# Patient Record
Sex: FEMALE | Race: WHITE | NOT HISPANIC OR LATINO | Employment: FULL TIME | URBAN - METROPOLITAN AREA
[De-identification: names, ages, dates, MRNs, and addresses within clinical notes are randomized per-mention and may not be internally consistent; named-entity substitution may affect disease eponyms.]

---

## 2023-05-23 PROBLEM — U07.1 COVID-19 VIRUS INFECTION: Status: ACTIVE | Noted: 2023-05-23

## 2023-05-23 PROBLEM — F41.1 GENERALIZED ANXIETY DISORDER: Status: ACTIVE | Noted: 2023-05-23

## 2023-05-23 PROBLEM — F32.5 DEPRESSION, MAJOR, IN REMISSION (CMS-HCC): Status: ACTIVE | Noted: 2023-05-23

## 2023-05-23 RX ORDER — PAROXETINE HYDROCHLORIDE 40 MG/1
1 TABLET, FILM COATED ORAL DAILY
COMMUNITY
Start: 2019-12-30 | End: 2023-06-06 | Stop reason: SDUPTHER

## 2023-05-23 RX ORDER — ONDANSETRON 4 MG/1
4 TABLET, ORALLY DISINTEGRATING ORAL
COMMUNITY
Start: 2020-07-30 | End: 2023-06-06 | Stop reason: WASHOUT

## 2023-05-23 RX ORDER — CODEINE PHOSPHATE AND GUAIFENESIN 10; 100 MG/5ML; MG/5ML
10 SOLUTION ORAL EVERY 8 HOURS
COMMUNITY
Start: 2022-11-04 | End: 2023-06-06 | Stop reason: ALTCHOICE

## 2023-06-06 ENCOUNTER — OFFICE VISIT (OUTPATIENT)
Dept: PRIMARY CARE | Facility: CLINIC | Age: 43
End: 2023-06-06
Payer: COMMERCIAL

## 2023-06-06 VITALS
SYSTOLIC BLOOD PRESSURE: 109 MMHG | OXYGEN SATURATION: 99 % | WEIGHT: 140 LBS | RESPIRATION RATE: 16 BRPM | BODY MASS INDEX: 23.32 KG/M2 | TEMPERATURE: 97.3 F | HEART RATE: 86 BPM | DIASTOLIC BLOOD PRESSURE: 71 MMHG | HEIGHT: 65 IN

## 2023-06-06 DIAGNOSIS — Z12.83 SKIN CANCER SCREENING: ICD-10-CM

## 2023-06-06 DIAGNOSIS — Z00.00 ANNUAL PHYSICAL EXAM: Primary | ICD-10-CM

## 2023-06-06 DIAGNOSIS — F42.4 SKIN-PICKING DISORDER: ICD-10-CM

## 2023-06-06 DIAGNOSIS — F41.1 GENERALIZED ANXIETY DISORDER: ICD-10-CM

## 2023-06-06 DIAGNOSIS — F32.5 DEPRESSION, MAJOR, IN REMISSION (CMS-HCC): ICD-10-CM

## 2023-06-06 PROBLEM — U07.1 COVID-19 VIRUS INFECTION: Status: RESOLVED | Noted: 2023-05-23 | Resolved: 2023-06-06

## 2023-06-06 PROCEDURE — 99396 PREV VISIT EST AGE 40-64: CPT | Performed by: FAMILY MEDICINE

## 2023-06-06 PROCEDURE — 1036F TOBACCO NON-USER: CPT | Performed by: FAMILY MEDICINE

## 2023-06-06 RX ORDER — PAROXETINE HYDROCHLORIDE 40 MG/1
40 TABLET, FILM COATED ORAL DAILY
Qty: 90 TABLET | Refills: 3 | Status: SHIPPED | OUTPATIENT
Start: 2023-06-06 | End: 2024-06-05

## 2023-06-06 SDOH — ECONOMIC STABILITY: FOOD INSECURITY: WITHIN THE PAST 12 MONTHS, YOU WORRIED THAT YOUR FOOD WOULD RUN OUT BEFORE YOU GOT MONEY TO BUY MORE.: NEVER TRUE

## 2023-06-06 SDOH — ECONOMIC STABILITY: FOOD INSECURITY: WITHIN THE PAST 12 MONTHS, THE FOOD YOU BOUGHT JUST DIDN'T LAST AND YOU DIDN'T HAVE MONEY TO GET MORE.: NEVER TRUE

## 2023-06-06 ASSESSMENT — LIFESTYLE VARIABLES
AUDIT-C TOTAL SCORE: 2
SKIP TO QUESTIONS 9-10: 1
HOW MANY STANDARD DRINKS CONTAINING ALCOHOL DO YOU HAVE ON A TYPICAL DAY: 1 OR 2
HOW OFTEN DO YOU HAVE A DRINK CONTAINING ALCOHOL: 2-4 TIMES A MONTH
HOW OFTEN DO YOU HAVE SIX OR MORE DRINKS ON ONE OCCASION: NEVER

## 2023-06-06 ASSESSMENT — PATIENT HEALTH QUESTIONNAIRE - PHQ9
1. LITTLE INTEREST OR PLEASURE IN DOING THINGS: NOT AT ALL
2. FEELING DOWN, DEPRESSED OR HOPELESS: NOT AT ALL
SUM OF ALL RESPONSES TO PHQ9 QUESTIONS 1 & 2: 0

## 2023-06-06 NOTE — PROGRESS NOTES
"Subjective   Patient ID: Deanna Davis is a 43 y.o. female who presents for Annual Exam and skin issues.  She is continuing to see a therapist for anxiety and will actually be doing a group session with her  later this week.     She has an appointment at [facility] and will be having a mammogram every 6 months and breast MRI every 6 months.     She believes she may have a skin picking disorder.         In addition to that documented in the HPI above, the additional ROS was obtained:  Constitutional: Denies fevers or chills  Eyes: Denies vision changes  ENMT: Denies trouble swallowing  Cardiovascular: Denies chest pain or heart racing  Respiratory: Denies SOB or cough  Gastrointestinal: Denies nausea, vomiting, diarrhea or constipation. Rare heartburn  Musculoskeletal: Denies joint pain or joint swelling  Neuro: denies frequent headaches or dizziness  Psych: denies depression or anxiety      Current Outpatient Medications   Medication Sig Dispense Refill    PARoxetine (Paxil) 40 mg tablet Take 1 tablet (40 mg) by mouth once daily. 90 tablet 3     No current facility-administered medications for this visit.       Objective     Visit Vitals  /71 (BP Location: Left arm, Patient Position: Sitting, BP Cuff Size: Adult)   Pulse 86   Temp 36.3 °C (97.3 °F)   Resp 16   Ht 1.656 m (5' 5.2\")   Wt 63.5 kg (140 lb)   SpO2 99%   BMI 23.15 kg/m²   Smoking Status Never   BSA 1.71 m²        Constitutional: Well nourished, well developed, appears stated age  Eyes: no scleral icterus, no conjunctival injection  Ears: normal external auditory canal, no retraction or bulging of tympanic membranes  Neck: no thyromegaly  Cardiovascular: regular rate and rhythm, no leg edema  Respiratory: normal respiratory effort, clear to auscultation bilaterally  Musculoskeletal: No gross deformities appreciated  Skin: Warm, dry. No rashes  Neurologic: Alert, CNs II-XII grossly intact..  Psych: Appropriate mood and affect.  "       Assessment/Plan   Problem List Items Addressed This Visit          Other    Depression, major, in remission (CMS/HCC)    Relevant Medications    PARoxetine (Paxil) 40 mg tablet    Generalized anxiety disorder    Relevant Medications    PARoxetine (Paxil) 40 mg tablet    Annual physical exam - Primary     Has gyn  Mammogram done 6/17/2022         Skin-picking disorder     Briefly discussed topiramate as possible treatment, she will consider and let me know.          Other Visit Diagnoses       Skin cancer screening        Relevant Orders    Referral to Dermatology          Follow up yearly and prn.    Nohemy Rojas, DO

## 2023-06-06 NOTE — PATIENT INSTRUCTIONS
Thank you for choosing [facility] for your healthcare.   As always if you have any questions or concerns please do not hesitate to call our office at [phone] or through [employer].    Have a great day!  Nohemy Rojas, DO

## 2023-10-20 ENCOUNTER — TELEPHONE (OUTPATIENT)
Dept: PRIMARY CARE | Facility: CLINIC | Age: 43
End: 2023-10-20
Payer: COMMERCIAL

## 2023-10-20 DIAGNOSIS — U07.1 COVID-19: Primary | ICD-10-CM

## 2023-10-20 NOTE — TELEPHONE ENCOUNTER
Pt started having symptoms of COVID on Monday but just tested positive yesterday. Pt is requesting an rx to help her.     How long have you been sick? Monday  Cough (productive or nonproductive)? Yes, productive   Color of phlegm? Pt isn't sure   Sinus pain? Yes   Sinus drainage/color? Yes, yellow   Earache? No  Congestion? Yes  Headache? Yes   Fever (if yes, temp)?  Sore throat? Yes  Short of breath/wheezing? No  Pt also has nausea. [facility] [place].

## 2023-11-30 PROBLEM — D25.9 UTERINE LEIOMYOMA: Status: ACTIVE | Noted: 2023-09-13

## 2023-11-30 RX ORDER — SPIRONOLACTONE 100 MG/1
100 TABLET, FILM COATED ORAL DAILY
COMMUNITY
Start: 2023-08-11

## 2023-11-30 RX ORDER — TRETINOIN 0.25 MG/G
CREAM TOPICAL NIGHTLY
COMMUNITY
Start: 2023-08-11

## 2023-12-01 ENCOUNTER — OFFICE VISIT (OUTPATIENT)
Dept: PRIMARY CARE | Facility: CLINIC | Age: 43
End: 2023-12-01
Payer: COMMERCIAL

## 2023-12-01 VITALS
RESPIRATION RATE: 16 BRPM | DIASTOLIC BLOOD PRESSURE: 73 MMHG | HEIGHT: 63 IN | OXYGEN SATURATION: 99 % | WEIGHT: 144 LBS | TEMPERATURE: 97.6 F | SYSTOLIC BLOOD PRESSURE: 110 MMHG | HEART RATE: 74 BPM | BODY MASS INDEX: 25.52 KG/M2

## 2023-12-01 DIAGNOSIS — F41.1 GENERALIZED ANXIETY DISORDER: Primary | ICD-10-CM

## 2023-12-01 PROCEDURE — 1036F TOBACCO NON-USER: CPT | Performed by: FAMILY MEDICINE

## 2023-12-01 PROCEDURE — 99214 OFFICE O/P EST MOD 30 MIN: CPT | Performed by: FAMILY MEDICINE

## 2023-12-01 RX ORDER — BUSPIRONE HYDROCHLORIDE 10 MG/1
10 TABLET ORAL 2 TIMES DAILY
Qty: 60 TABLET | Refills: 5 | Status: SHIPPED | OUTPATIENT
Start: 2023-12-01 | End: 2024-05-29

## 2023-12-01 ASSESSMENT — ENCOUNTER SYMPTOMS
LOSS OF SENSATION IN FEET: 0
OCCASIONAL FEELINGS OF UNSTEADINESS: 0
DEPRESSION: 0

## 2023-12-01 ASSESSMENT — ANXIETY QUESTIONNAIRES
3. WORRYING TOO MUCH ABOUT DIFFERENT THINGS: NEARLY EVERY DAY
5. BEING SO RESTLESS THAT IT IS HARD TO SIT STILL: SEVERAL DAYS
GAD7 TOTAL SCORE: 18
7. FEELING AFRAID AS IF SOMETHING AWFUL MIGHT HAPPEN: MORE THAN HALF THE DAYS
6. BECOMING EASILY ANNOYED OR IRRITABLE: NEARLY EVERY DAY
2. NOT BEING ABLE TO STOP OR CONTROL WORRYING: NEARLY EVERY DAY
1. FEELING NERVOUS, ANXIOUS, OR ON EDGE: NEARLY EVERY DAY
4. TROUBLE RELAXING: NEARLY EVERY DAY
IF YOU CHECKED OFF ANY PROBLEMS ON THIS QUESTIONNAIRE, HOW DIFFICULT HAVE THESE PROBLEMS MADE IT FOR YOU TO DO YOUR WORK, TAKE CARE OF THINGS AT HOME, OR GET ALONG WITH OTHER PEOPLE: VERY DIFFICULT

## 2023-12-01 ASSESSMENT — PAIN SCALES - GENERAL: PAINLEVEL: 0-NO PAIN

## 2023-12-01 NOTE — PATIENT INSTRUCTIONS
Thank you for choosing [facility] for your healthcare.   As always if you have any questions or concerns please do not hesitate to call our office at [phone] or through [email or web address].    Have a great day!  Nohemy Rojas, DO

## 2023-12-01 NOTE — PROGRESS NOTES
"Subjective   Patient ID: Deanna Davis is a 43 y.o. female who presents for Anxiety.  She is currently seeing a therapist and her and her  recently started couples counseling.     She has been on Paxil for a few years but recently, in the past 3-6 months, her and her family have noticed that she is having a lot of anxiety despite the medication. She has had a few panic attacks as well.         Current Outpatient Medications   Medication Sig Dispense Refill    PARoxetine (Paxil) 40 mg tablet Take 1 tablet (40 mg) by mouth once daily. 90 tablet 3    spironolactone (Aldactone) 100 mg tablet Take 1 tablet (100 mg) by mouth once daily.      tretinoin (Retin-A) 0.025 % cream Apply topically once daily at bedtime.      busPIRone (Buspar) 10 mg tablet Take 1 tablet (10 mg) by mouth 2 times a day. 60 tablet 5     No current facility-administered medications for this visit.       Objective     Visit Vitals  /73 (BP Location: Left arm, Patient Position: Sitting, BP Cuff Size: Adult)   Pulse 74   Temp 36.4 °C (97.6 °F) (Temporal)   Resp 16   Ht 1.588 m (5' 2.5\")   Wt 65.3 kg (144 lb)   SpO2 99%   BMI 25.92 kg/m²   Smoking Status Never   BSA 1.7 m²        Constitutional: Well nourished, well developed, appears stated age  Eyes: no scleral icterus, no conjunctival injection  Ears: normal external auditory canal, no retraction or bulging of tympanic membranes  Neck: no thyromegaly  Cardiovascular: regular rate and rhythm, no leg edema  Respiratory: normal respiratory effort, clear to auscultation bilaterally  Musculoskeletal: No gross deformities appreciated  Skin: Warm, dry. No rashes  Neurologic: Alert, CNs II-XII grossly intact..  Psych: Appropriate mood and affect.        Assessment/Plan   Problem List Items Addressed This Visit       Generalized anxiety disorder - Primary (Chronic)     Continue Paxil  Add Buspar 10 mg qAM, may increase to BID if needed  She will reach out via [employer] in about 1 month with " update         Relevant Medications    busPIRone (Buspar) 10 mg tablet     Follow up as scheduled.    Nohemy Rojas, DO

## 2024-06-10 NOTE — PATIENT INSTRUCTIONS
Please fast for 8 hours for the blood work. Water and black coffee is fine. You do not need an appointment to have your labs done.     Thank you for choosing [facility] for your healthcare.   As always if you have any questions or concerns please do not hesitate to call our office at [phone] or through [employer].    Have a great day!  Nohemy Rojas, DO

## 2024-06-10 NOTE — PROGRESS NOTES
"Subjective   Patient ID: Deanna Davis is a 44 y.o. female who presents for Annual Exam.  Her anxiety is currently controlled with Buspar but her depression waxes and wanes. She has only ever been on Paxil for depression.     She reports fatigue despite getting 8 hours of sleep per night.         Patient Care Team:  Nohemy Rojas DO as PCP - General  Farzana Richmond MD as Consulting Physician (Obstetrics and Gynecology)    Current Outpatient Medications   Medication Sig Dispense Refill    doxycycline (Vibramycin) 100 mg capsule Take 1 capsule (100 mg) by mouth every 12 hours.      spironolactone (Aldactone) 100 mg tablet Take 1 tablet (100 mg) by mouth once daily.      tretinoin (Retin-A) 0.025 % cream Apply topically once daily at bedtime.      busPIRone (Buspar) 10 mg tablet Take 1 tablet (10 mg) by mouth once daily. 90 tablet 3    PARoxetine (Paxil) 40 mg tablet Take 1 tablet (40 mg) by mouth once daily. 90 tablet 3     No current facility-administered medications for this visit.       Objective     Visit Vitals  /69 (BP Location: Right arm, Patient Position: Sitting)   Pulse 72   Temp 36.2 °C (97.1 °F) (Skin)   Resp 13   Ht 1.588 m (5' 2.5\")   Wt 65.8 kg (145 lb)   SpO2 99%   BMI 26.10 kg/m²   Smoking Status Never   BSA 1.7 m²        Constitutional: Well nourished, well developed, appears stated age  Eyes: no scleral icterus, no conjunctival injection  Ears: normal external auditory canal, no retraction or bulging of tympanic membranes  Neck: no thyromegaly  Cardiovascular: regular rate and rhythm, no leg edema  Respiratory: normal respiratory effort, clear to auscultation bilaterally  Musculoskeletal: No gross deformities appreciated  Skin: Warm, dry. No rashes  Neurologic: Alert, CNs II-XII grossly intact..  Psych: Appropriate mood and affect.        Assessment/Plan   Problem List Items Addressed This Visit       Moderate recurrent major depression (Multi)     Uncontrolled  Continue Paxil  Labs " ordered, if results unrevealing would consider switching from Paxil to Wellbutrin or Prozac         Relevant Medications    PARoxetine (Paxil) 40 mg tablet    Generalized anxiety disorder (Chronic)     Better with addition of Buspar, continue         Relevant Medications    busPIRone (Buspar) 10 mg tablet    PARoxetine (Paxil) 40 mg tablet    Annual physical exam - Primary     Has gyn  Mammogram done 6/17/2022          Other Visit Diagnoses       Screening for diabetes mellitus        Relevant Orders    Comprehensive Metabolic Panel    Screening for cholesterol level        Relevant Orders    Lipid Panel    Screening for thyroid disorder        Relevant Orders    TSH with reflex to Free T4 if abnormal    Fatigue, unspecified type        Relevant Orders    Vitamin B12    Screening for deficiency anemia        Relevant Orders    CBC            Follow up 6 months.    Nohemy Rojas, DO

## 2024-06-11 ENCOUNTER — APPOINTMENT (OUTPATIENT)
Dept: PRIMARY CARE | Facility: CLINIC | Age: 44
End: 2024-06-11
Payer: COMMERCIAL

## 2024-06-11 VITALS
BODY MASS INDEX: 25.69 KG/M2 | OXYGEN SATURATION: 99 % | SYSTOLIC BLOOD PRESSURE: 103 MMHG | HEART RATE: 72 BPM | WEIGHT: 145 LBS | DIASTOLIC BLOOD PRESSURE: 69 MMHG | RESPIRATION RATE: 13 BRPM | TEMPERATURE: 97.1 F | HEIGHT: 63 IN

## 2024-06-11 DIAGNOSIS — Z13.1 SCREENING FOR DIABETES MELLITUS: ICD-10-CM

## 2024-06-11 DIAGNOSIS — F41.1 GENERALIZED ANXIETY DISORDER: ICD-10-CM

## 2024-06-11 DIAGNOSIS — Z00.00 ANNUAL PHYSICAL EXAM: Primary | ICD-10-CM

## 2024-06-11 DIAGNOSIS — Z13.0 SCREENING FOR DEFICIENCY ANEMIA: ICD-10-CM

## 2024-06-11 DIAGNOSIS — Z13.29 SCREENING FOR THYROID DISORDER: ICD-10-CM

## 2024-06-11 DIAGNOSIS — R53.83 FATIGUE, UNSPECIFIED TYPE: ICD-10-CM

## 2024-06-11 DIAGNOSIS — Z13.220 SCREENING FOR CHOLESTEROL LEVEL: ICD-10-CM

## 2024-06-11 DIAGNOSIS — F32.5 DEPRESSION, MAJOR, IN REMISSION (CMS-HCC): ICD-10-CM

## 2024-06-11 DIAGNOSIS — F33.1 MODERATE RECURRENT MAJOR DEPRESSION (MULTI): ICD-10-CM

## 2024-06-11 PROCEDURE — 1036F TOBACCO NON-USER: CPT | Performed by: FAMILY MEDICINE

## 2024-06-11 PROCEDURE — 99396 PREV VISIT EST AGE 40-64: CPT | Performed by: FAMILY MEDICINE

## 2024-06-11 RX ORDER — DOXYCYCLINE 100 MG/1
1 CAPSULE ORAL
COMMUNITY
Start: 2024-05-06

## 2024-06-11 RX ORDER — PAROXETINE HYDROCHLORIDE 40 MG/1
40 TABLET, FILM COATED ORAL DAILY
Qty: 90 TABLET | Refills: 3 | Status: SHIPPED | OUTPATIENT
Start: 2024-06-11 | End: 2025-06-11

## 2024-06-11 RX ORDER — BUSPIRONE HYDROCHLORIDE 10 MG/1
10 TABLET ORAL DAILY
Qty: 90 TABLET | Refills: 3 | Status: SHIPPED | OUTPATIENT
Start: 2024-06-11 | End: 2025-06-11

## 2024-06-11 SDOH — ECONOMIC STABILITY: FOOD INSECURITY: WITHIN THE PAST 12 MONTHS, YOU WORRIED THAT YOUR FOOD WOULD RUN OUT BEFORE YOU GOT MONEY TO BUY MORE.: NEVER TRUE

## 2024-06-11 SDOH — ECONOMIC STABILITY: FOOD INSECURITY: WITHIN THE PAST 12 MONTHS, THE FOOD YOU BOUGHT JUST DIDN'T LAST AND YOU DIDN'T HAVE MONEY TO GET MORE.: NEVER TRUE

## 2024-06-11 ASSESSMENT — LIFESTYLE VARIABLES
SKIP TO QUESTIONS 9-10: 1
AUDIT-C TOTAL SCORE: 0
HOW MANY STANDARD DRINKS CONTAINING ALCOHOL DO YOU HAVE ON A TYPICAL DAY: PATIENT DOES NOT DRINK
HOW OFTEN DO YOU HAVE A DRINK CONTAINING ALCOHOL: NEVER
HOW OFTEN DO YOU HAVE SIX OR MORE DRINKS ON ONE OCCASION: NEVER

## 2024-06-11 ASSESSMENT — PATIENT HEALTH QUESTIONNAIRE - PHQ9
6. FEELING BAD ABOUT YOURSELF - OR THAT YOU ARE A FAILURE OR HAVE LET YOURSELF OR YOUR FAMILY DOWN: SEVERAL DAYS
10. IF YOU CHECKED OFF ANY PROBLEMS, HOW DIFFICULT HAVE THESE PROBLEMS MADE IT FOR YOU TO DO YOUR WORK, TAKE CARE OF THINGS AT HOME, OR GET ALONG WITH OTHER PEOPLE: SOMEWHAT DIFFICULT
3. TROUBLE FALLING OR STAYING ASLEEP: NOT AT ALL
4. FEELING TIRED OR HAVING LITTLE ENERGY: NEARLY EVERY DAY
2. FEELING DOWN, DEPRESSED OR HOPELESS: MORE THAN HALF THE DAYS
SUM OF ALL RESPONSES TO PHQ9 QUESTIONS 1 & 2: 3
8. MOVING OR SPEAKING SO SLOWLY THAT OTHER PEOPLE COULD HAVE NOTICED. OR THE OPPOSITE, BEING SO FIGETY OR RESTLESS THAT YOU HAVE BEEN MOVING AROUND A LOT MORE THAN USUAL: NOT AT ALL
1. LITTLE INTEREST OR PLEASURE IN DOING THINGS: SEVERAL DAYS
7. TROUBLE CONCENTRATING ON THINGS, SUCH AS READING THE NEWSPAPER OR WATCHING TELEVISION: NOT AT ALL
9. THOUGHTS THAT YOU WOULD BE BETTER OFF DEAD, OR OF HURTING YOURSELF: NOT AT ALL
SUM OF ALL RESPONSES TO PHQ QUESTIONS 1-9: 10
5. POOR APPETITE OR OVEREATING: NEARLY EVERY DAY

## 2024-06-11 ASSESSMENT — ANXIETY QUESTIONNAIRES
5. BEING SO RESTLESS THAT IT IS HARD TO SIT STILL: NOT AT ALL
IF YOU CHECKED OFF ANY PROBLEMS ON THIS QUESTIONNAIRE, HOW DIFFICULT HAVE THESE PROBLEMS MADE IT FOR YOU TO DO YOUR WORK, TAKE CARE OF THINGS AT HOME, OR GET ALONG WITH OTHER PEOPLE: SOMEWHAT DIFFICULT
4. TROUBLE RELAXING: NOT AT ALL
1. FEELING NERVOUS, ANXIOUS, OR ON EDGE: NEARLY EVERY DAY
2. NOT BEING ABLE TO STOP OR CONTROL WORRYING: NEARLY EVERY DAY
7. FEELING AFRAID AS IF SOMETHING AWFUL MIGHT HAPPEN: MORE THAN HALF THE DAYS
3. WORRYING TOO MUCH ABOUT DIFFERENT THINGS: NEARLY EVERY DAY
6. BECOMING EASILY ANNOYED OR IRRITABLE: SEVERAL DAYS
GAD7 TOTAL SCORE: 12

## 2024-06-11 ASSESSMENT — PAIN SCALES - GENERAL: PAINLEVEL: 0-NO PAIN

## 2024-06-11 NOTE — ASSESSMENT & PLAN NOTE
Uncontrolled  Continue Paxil  Labs ordered, if results unrevealing would consider switching from Paxil to Wellbutrin or Prozac

## 2024-06-22 ENCOUNTER — LAB (OUTPATIENT)
Dept: LAB | Facility: LAB | Age: 44
End: 2024-06-22
Payer: COMMERCIAL

## 2024-06-22 DIAGNOSIS — R53.83 FATIGUE, UNSPECIFIED TYPE: ICD-10-CM

## 2024-06-22 DIAGNOSIS — Z13.29 SCREENING FOR THYROID DISORDER: ICD-10-CM

## 2024-06-22 DIAGNOSIS — Z13.220 SCREENING FOR CHOLESTEROL LEVEL: ICD-10-CM

## 2024-06-22 DIAGNOSIS — Z13.1 SCREENING FOR DIABETES MELLITUS: ICD-10-CM

## 2024-06-22 DIAGNOSIS — Z13.0 SCREENING FOR DEFICIENCY ANEMIA: ICD-10-CM

## 2024-06-22 LAB
ALBUMIN SERPL BCP-MCNC: 4.1 G/DL (ref 3.4–5)
ALP SERPL-CCNC: 27 U/L (ref 33–110)
ALT SERPL W P-5'-P-CCNC: 8 U/L (ref 7–45)
ANION GAP SERPL CALC-SCNC: 9 MMOL/L (ref 10–20)
AST SERPL W P-5'-P-CCNC: 12 U/L (ref 9–39)
BILIRUB SERPL-MCNC: 0.5 MG/DL (ref 0–1.2)
BUN SERPL-MCNC: 29 MG/DL (ref 6–23)
CALCIUM SERPL-MCNC: 8.9 MG/DL (ref 8.6–10.3)
CHLORIDE SERPL-SCNC: 103 MMOL/L (ref 98–107)
CHOLEST SERPL-MCNC: 157 MG/DL (ref 0–199)
CHOLESTEROL/HDL RATIO: 3.1
CO2 SERPL-SCNC: 30 MMOL/L (ref 21–32)
CREAT SERPL-MCNC: 0.74 MG/DL (ref 0.5–1.05)
EGFRCR SERPLBLD CKD-EPI 2021: >90 ML/MIN/1.73M*2
ERYTHROCYTE [DISTWIDTH] IN BLOOD BY AUTOMATED COUNT: 13.1 % (ref 11.5–14.5)
GLUCOSE SERPL-MCNC: 76 MG/DL (ref 74–99)
HCT VFR BLD AUTO: 42.4 % (ref 36–46)
HDLC SERPL-MCNC: 51.3 MG/DL
HGB BLD-MCNC: 14.4 G/DL (ref 12–16)
LDLC SERPL CALC-MCNC: 93 MG/DL
MCH RBC QN AUTO: 31.4 PG (ref 26–34)
MCHC RBC AUTO-ENTMCNC: 34 G/DL (ref 32–36)
MCV RBC AUTO: 92 FL (ref 80–100)
NON HDL CHOLESTEROL: 106 MG/DL (ref 0–149)
NRBC BLD-RTO: 0 /100 WBCS (ref 0–0)
PLATELET # BLD AUTO: 277 X10*3/UL (ref 150–450)
POTASSIUM SERPL-SCNC: 4.6 MMOL/L (ref 3.5–5.3)
PROT SERPL-MCNC: 6.5 G/DL (ref 6.4–8.2)
RBC # BLD AUTO: 4.59 X10*6/UL (ref 4–5.2)
SODIUM SERPL-SCNC: 137 MMOL/L (ref 136–145)
TRIGL SERPL-MCNC: 64 MG/DL (ref 0–149)
TSH SERPL-ACNC: 0.85 MIU/L (ref 0.44–3.98)
VIT B12 SERPL-MCNC: 718 PG/ML (ref 211–911)
VLDL: 13 MG/DL (ref 0–40)
WBC # BLD AUTO: 3.9 X10*3/UL (ref 4.4–11.3)

## 2024-06-22 PROCEDURE — 80053 COMPREHEN METABOLIC PANEL: CPT

## 2024-06-22 PROCEDURE — 82607 VITAMIN B-12: CPT

## 2024-06-22 PROCEDURE — 36415 COLL VENOUS BLD VENIPUNCTURE: CPT

## 2024-06-22 PROCEDURE — 84443 ASSAY THYROID STIM HORMONE: CPT

## 2024-06-22 PROCEDURE — 80061 LIPID PANEL: CPT

## 2024-06-22 PROCEDURE — 85027 COMPLETE CBC AUTOMATED: CPT

## 2024-06-29 ENCOUNTER — PATIENT MESSAGE (OUTPATIENT)
Dept: PRIMARY CARE | Facility: CLINIC | Age: 44
End: 2024-06-29
Payer: COMMERCIAL

## 2024-06-29 DIAGNOSIS — F41.1 GENERALIZED ANXIETY DISORDER: ICD-10-CM

## 2024-06-29 DIAGNOSIS — F32.5 DEPRESSION, MAJOR, IN REMISSION (CMS-HCC): ICD-10-CM

## 2024-07-01 RX ORDER — PAROXETINE 30 MG/1
30 TABLET, FILM COATED ORAL DAILY
Qty: 30 TABLET | Refills: 0 | Status: SHIPPED | OUTPATIENT
Start: 2024-07-01 | End: 2024-07-31

## 2024-07-01 RX ORDER — BUPROPION HYDROCHLORIDE 150 MG/1
150 TABLET ORAL EVERY MORNING
Qty: 30 TABLET | Refills: 5 | Status: SHIPPED | OUTPATIENT
Start: 2024-07-01 | End: 2024-12-28

## 2024-07-26 ENCOUNTER — PATIENT MESSAGE (OUTPATIENT)
Dept: PRIMARY CARE | Facility: CLINIC | Age: 44
End: 2024-07-26
Payer: COMMERCIAL

## 2024-07-30 ENCOUNTER — TELEPHONE (OUTPATIENT)
Dept: PRIMARY CARE | Facility: CLINIC | Age: 44
End: 2024-07-30
Payer: COMMERCIAL

## 2024-07-30 DIAGNOSIS — F32.5 DEPRESSION, MAJOR, IN REMISSION (CMS-HCC): ICD-10-CM

## 2024-07-30 DIAGNOSIS — F41.1 GENERALIZED ANXIETY DISORDER: ICD-10-CM

## 2024-07-30 RX ORDER — PAROXETINE HYDROCHLORIDE 20 MG/1
TABLET, FILM COATED ORAL
Qty: 21 TABLET | Refills: 0 | Status: SHIPPED | OUTPATIENT
Start: 2024-07-30

## 2024-07-30 NOTE — TELEPHONE ENCOUNTER
[facility] pharmacy called [phone] to clarify the directions and quantity. They said there are also no directions. Would like another prescription sent in.

## 2024-09-03 ENCOUNTER — PATIENT MESSAGE (OUTPATIENT)
Dept: PRIMARY CARE | Facility: CLINIC | Age: 44
End: 2024-09-03
Payer: COMMERCIAL

## 2024-09-03 DIAGNOSIS — F33.1 MODERATE RECURRENT MAJOR DEPRESSION (MULTI): ICD-10-CM

## 2024-09-03 DIAGNOSIS — F41.1 GENERALIZED ANXIETY DISORDER: Primary | Chronic | ICD-10-CM

## 2024-09-03 RX ORDER — PAROXETINE 10 MG/1
TABLET, FILM COATED ORAL
Qty: 21 TABLET | Refills: 0 | Status: SHIPPED | OUTPATIENT
Start: 2024-09-03

## 2024-09-25 ENCOUNTER — PATIENT MESSAGE (OUTPATIENT)
Dept: PRIMARY CARE | Facility: CLINIC | Age: 44
End: 2024-09-25
Payer: COMMERCIAL

## 2024-09-25 DIAGNOSIS — F33.1 MODERATE RECURRENT MAJOR DEPRESSION: Primary | ICD-10-CM

## 2024-09-25 RX ORDER — PAROXETINE 10 MG/5ML
7.5 SUSPENSION ORAL EVERY MORNING
Qty: 250 ML | Refills: 0 | Status: SHIPPED | OUTPATIENT
Start: 2024-09-25 | End: 2024-11-30

## 2024-11-20 ENCOUNTER — TELEPHONE (OUTPATIENT)
Dept: PRIMARY CARE | Facility: CLINIC | Age: 44
End: 2024-11-20
Payer: COMMERCIAL

## 2024-11-20 DIAGNOSIS — F41.1 GENERALIZED ANXIETY DISORDER: ICD-10-CM

## 2024-11-20 DIAGNOSIS — F32.5 DEPRESSION, MAJOR, IN REMISSION (CMS-HCC): ICD-10-CM

## 2024-11-20 RX ORDER — BUSPIRONE HYDROCHLORIDE 10 MG/1
10 TABLET ORAL DAILY
Qty: 90 TABLET | Refills: 0 | Status: SHIPPED | OUTPATIENT
Start: 2024-11-20 | End: 2025-11-20

## 2024-11-20 RX ORDER — BUPROPION HYDROCHLORIDE 150 MG/1
150 TABLET ORAL EVERY MORNING
Qty: 90 TABLET | Refills: 0 | Status: SHIPPED | OUTPATIENT
Start: 2024-11-20 | End: 2025-02-18

## 2024-11-20 NOTE — TELEPHONE ENCOUNTER
Patient needs refill on:      buPROPion XL (Wellbutrin XL) 150 mg 24 hr tablet [[id number]     busPIRone (Buspar) 10 mg tablet [[id number]]     She wants to change her pharmacy to mail order:    [facility]

## 2024-11-25 DIAGNOSIS — F41.1 GENERALIZED ANXIETY DISORDER: ICD-10-CM

## 2024-11-25 DIAGNOSIS — F32.5 DEPRESSION, MAJOR, IN REMISSION (CMS-HCC): ICD-10-CM

## 2024-11-25 RX ORDER — BUPROPION HYDROCHLORIDE 150 MG/1
150 TABLET ORAL EVERY MORNING
Qty: 30 TABLET | Refills: 0 | Status: SHIPPED | OUTPATIENT
Start: 2024-11-25 | End: 2024-12-25

## 2024-11-25 RX ORDER — BUSPIRONE HYDROCHLORIDE 10 MG/1
10 TABLET ORAL DAILY
Qty: 30 TABLET | Refills: 0 | Status: SHIPPED | OUTPATIENT
Start: 2024-11-25 | End: 2025-11-25

## 2024-12-02 DIAGNOSIS — F33.1 MODERATE RECURRENT MAJOR DEPRESSION: ICD-10-CM

## 2024-12-02 RX ORDER — PAROXETINE 10 MG/5ML
7.5 SUSPENSION ORAL EVERY MORNING
Qty: 250 ML | Refills: 0 | Status: SHIPPED | OUTPATIENT
Start: 2024-12-02 | End: 2024-12-04 | Stop reason: SDUPTHER

## 2024-12-02 NOTE — TELEPHONE ENCOUNTER
Rx refill request [facility] [place], [place]   Paroxetine 10 mg     LOV 6/11/2024  NOV 12/17/2024  Cancellations None

## 2024-12-04 RX ORDER — PAROXETINE 10 MG/5ML
7.5 SUSPENSION ORAL EVERY MORNING
Qty: 250 ML | Refills: 0 | Status: SHIPPED | OUTPATIENT
Start: 2024-12-04 | End: 2025-02-08

## 2024-12-04 NOTE — TELEPHONE ENCOUNTER
Refill was sent to [place] pharmacy on Monday.  Please let patient know and recommend she reach out to pharmacy to check status of refill. Thanks,  Nohemy Rojas, DO

## 2024-12-17 ENCOUNTER — LAB (OUTPATIENT)
Dept: LAB | Facility: LAB | Age: 44
End: 2024-12-17
Payer: COMMERCIAL

## 2024-12-17 ENCOUNTER — APPOINTMENT (OUTPATIENT)
Dept: PRIMARY CARE | Facility: CLINIC | Age: 44
End: 2024-12-17
Payer: COMMERCIAL

## 2024-12-17 VITALS
HEART RATE: 84 BPM | BODY MASS INDEX: 25.34 KG/M2 | OXYGEN SATURATION: 99 % | SYSTOLIC BLOOD PRESSURE: 103 MMHG | HEIGHT: 63 IN | DIASTOLIC BLOOD PRESSURE: 72 MMHG | WEIGHT: 143 LBS | RESPIRATION RATE: 20 BRPM | TEMPERATURE: 96.8 F

## 2024-12-17 DIAGNOSIS — D72.819 LEUKOPENIA, UNSPECIFIED TYPE: ICD-10-CM

## 2024-12-17 DIAGNOSIS — F41.1 GENERALIZED ANXIETY DISORDER: ICD-10-CM

## 2024-12-17 DIAGNOSIS — F33.1 MODERATE RECURRENT MAJOR DEPRESSION: Chronic | ICD-10-CM

## 2024-12-17 DIAGNOSIS — Z80.3 FAMILY HISTORY OF BREAST CANCER: ICD-10-CM

## 2024-12-17 DIAGNOSIS — F33.1 MODERATE RECURRENT MAJOR DEPRESSION: Primary | Chronic | ICD-10-CM

## 2024-12-17 DIAGNOSIS — Z82.0 FAMILY HISTORY OF ALZHEIMER DISEASE: ICD-10-CM

## 2024-12-17 LAB
ALBUMIN SERPL BCP-MCNC: 4.4 G/DL (ref 3.4–5)
ALP SERPL-CCNC: 34 U/L (ref 33–110)
ALT SERPL W P-5'-P-CCNC: 7 U/L (ref 7–45)
ANION GAP SERPL CALC-SCNC: 10 MMOL/L (ref 10–20)
AST SERPL W P-5'-P-CCNC: 13 U/L (ref 9–39)
BASOPHILS # BLD AUTO: 0.04 X10*3/UL (ref 0–0.1)
BASOPHILS NFR BLD AUTO: 0.8 %
BILIRUB SERPL-MCNC: 0.3 MG/DL (ref 0–1.2)
BUN SERPL-MCNC: 28 MG/DL (ref 6–23)
CALCIUM SERPL-MCNC: 9.4 MG/DL (ref 8.6–10.3)
CHLORIDE SERPL-SCNC: 99 MMOL/L (ref 98–107)
CO2 SERPL-SCNC: 29 MMOL/L (ref 21–32)
CREAT SERPL-MCNC: 0.79 MG/DL (ref 0.5–1.05)
EGFRCR SERPLBLD CKD-EPI 2021: >90 ML/MIN/1.73M*2
EOSINOPHIL # BLD AUTO: 0.1 X10*3/UL (ref 0–0.7)
EOSINOPHIL NFR BLD AUTO: 1.9 %
ERYTHROCYTE [DISTWIDTH] IN BLOOD BY AUTOMATED COUNT: 12.6 % (ref 11.5–14.5)
GLUCOSE SERPL-MCNC: 83 MG/DL (ref 74–99)
HCT VFR BLD AUTO: 44.7 % (ref 36–46)
HGB BLD-MCNC: 15 G/DL (ref 12–16)
IMM GRANULOCYTES # BLD AUTO: 0.01 X10*3/UL (ref 0–0.7)
IMM GRANULOCYTES NFR BLD AUTO: 0.2 % (ref 0–0.9)
LYMPHOCYTES # BLD AUTO: 1.59 X10*3/UL (ref 1.2–4.8)
LYMPHOCYTES NFR BLD AUTO: 30.9 %
MCH RBC QN AUTO: 30.8 PG (ref 26–34)
MCHC RBC AUTO-ENTMCNC: 33.6 G/DL (ref 32–36)
MCV RBC AUTO: 92 FL (ref 80–100)
MONOCYTES # BLD AUTO: 0.3 X10*3/UL (ref 0.1–1)
MONOCYTES NFR BLD AUTO: 5.8 %
NEUTROPHILS # BLD AUTO: 3.11 X10*3/UL (ref 1.2–7.7)
NEUTROPHILS NFR BLD AUTO: 60.4 %
NRBC BLD-RTO: 0 /100 WBCS (ref 0–0)
PLATELET # BLD AUTO: 309 X10*3/UL (ref 150–450)
POTASSIUM SERPL-SCNC: 4.1 MMOL/L (ref 3.5–5.3)
PROT SERPL-MCNC: 7.5 G/DL (ref 6.4–8.2)
RBC # BLD AUTO: 4.87 X10*6/UL (ref 4–5.2)
SODIUM SERPL-SCNC: 134 MMOL/L (ref 136–145)
WBC # BLD AUTO: 5.2 X10*3/UL (ref 4.4–11.3)

## 2024-12-17 PROCEDURE — 85025 COMPLETE CBC W/AUTO DIFF WBC: CPT

## 2024-12-17 PROCEDURE — 3008F BODY MASS INDEX DOCD: CPT | Performed by: FAMILY MEDICINE

## 2024-12-17 PROCEDURE — 36415 COLL VENOUS BLD VENIPUNCTURE: CPT

## 2024-12-17 PROCEDURE — 99213 OFFICE O/P EST LOW 20 MIN: CPT | Performed by: FAMILY MEDICINE

## 2024-12-17 PROCEDURE — 80053 COMPREHEN METABOLIC PANEL: CPT

## 2024-12-17 RX ORDER — BUSPIRONE HYDROCHLORIDE 10 MG/1
10 TABLET ORAL 2 TIMES DAILY
Qty: 180 TABLET | Refills: 3 | Status: SHIPPED | OUTPATIENT
Start: 2024-12-17 | End: 2025-12-17

## 2024-12-17 RX ORDER — PAROXETINE 10 MG/1
5 TABLET, FILM COATED ORAL EVERY MORNING
COMMUNITY

## 2024-12-17 SDOH — ECONOMIC STABILITY: FOOD INSECURITY: WITHIN THE PAST 12 MONTHS, YOU WORRIED THAT YOUR FOOD WOULD RUN OUT BEFORE YOU GOT MONEY TO BUY MORE.: NEVER TRUE

## 2024-12-17 SDOH — ECONOMIC STABILITY: FOOD INSECURITY: WITHIN THE PAST 12 MONTHS, THE FOOD YOU BOUGHT JUST DIDN'T LAST AND YOU DIDN'T HAVE MONEY TO GET MORE.: NEVER TRUE

## 2024-12-17 ASSESSMENT — LIFESTYLE VARIABLES
HOW MANY STANDARD DRINKS CONTAINING ALCOHOL DO YOU HAVE ON A TYPICAL DAY: 1 OR 2
HOW OFTEN DO YOU HAVE A DRINK CONTAINING ALCOHOL: MONTHLY OR LESS
HOW OFTEN DO YOU HAVE SIX OR MORE DRINKS ON ONE OCCASION: NEVER
AUDIT-C TOTAL SCORE: 1
SKIP TO QUESTIONS 9-10: 1

## 2024-12-17 ASSESSMENT — ENCOUNTER SYMPTOMS
DEPRESSION: 1
LOSS OF SENSATION IN FEET: 0

## 2024-12-17 ASSESSMENT — ANXIETY QUESTIONNAIRES
5. BEING SO RESTLESS THAT IT IS HARD TO SIT STILL: NEARLY EVERY DAY
6. BECOMING EASILY ANNOYED OR IRRITABLE: NEARLY EVERY DAY
IF YOU CHECKED OFF ANY PROBLEMS ON THIS QUESTIONNAIRE, HOW DIFFICULT HAVE THESE PROBLEMS MADE IT FOR YOU TO DO YOUR WORK, TAKE CARE OF THINGS AT HOME, OR GET ALONG WITH OTHER PEOPLE: VERY DIFFICULT
4. TROUBLE RELAXING: NEARLY EVERY DAY
3. WORRYING TOO MUCH ABOUT DIFFERENT THINGS: NEARLY EVERY DAY
7. FEELING AFRAID AS IF SOMETHING AWFUL MIGHT HAPPEN: MORE THAN HALF THE DAYS
1. FEELING NERVOUS, ANXIOUS, OR ON EDGE: NEARLY EVERY DAY
GAD7 TOTAL SCORE: 20
2. NOT BEING ABLE TO STOP OR CONTROL WORRYING: NEARLY EVERY DAY

## 2024-12-17 ASSESSMENT — PATIENT HEALTH QUESTIONNAIRE - PHQ9
7. TROUBLE CONCENTRATING ON THINGS, SUCH AS READING THE NEWSPAPER OR WATCHING TELEVISION: NOT AT ALL
9. THOUGHTS THAT YOU WOULD BE BETTER OFF DEAD, OR OF HURTING YOURSELF: NOT AT ALL
6. FEELING BAD ABOUT YOURSELF - OR THAT YOU ARE A FAILURE OR HAVE LET YOURSELF OR YOUR FAMILY DOWN: MORE THAN HALF THE DAYS
SUM OF ALL RESPONSES TO PHQ QUESTIONS 1-9: 12
4. FEELING TIRED OR HAVING LITTLE ENERGY: MORE THAN HALF THE DAYS
1. LITTLE INTEREST OR PLEASURE IN DOING THINGS: NEARLY EVERY DAY
5. POOR APPETITE OR OVEREATING: MORE THAN HALF THE DAYS
10. IF YOU CHECKED OFF ANY PROBLEMS, HOW DIFFICULT HAVE THESE PROBLEMS MADE IT FOR YOU TO DO YOUR WORK, TAKE CARE OF THINGS AT HOME, OR GET ALONG WITH OTHER PEOPLE: SOMEWHAT DIFFICULT
8. MOVING OR SPEAKING SO SLOWLY THAT OTHER PEOPLE COULD HAVE NOTICED. OR THE OPPOSITE, BEING SO FIGETY OR RESTLESS THAT YOU HAVE BEEN MOVING AROUND A LOT MORE THAN USUAL: NOT AT ALL
2. FEELING DOWN, DEPRESSED OR HOPELESS: NEARLY EVERY DAY
SUM OF ALL RESPONSES TO PHQ9 QUESTIONS 1 & 2: 6
3. TROUBLE FALLING OR STAYING ASLEEP: NOT AT ALL

## 2024-12-17 ASSESSMENT — PAIN SCALES - GENERAL: PAINLEVEL_OUTOF10: 0-NO PAIN

## 2024-12-17 NOTE — PATIENT INSTRUCTIONS
Please call  central scheduling [phone] to schedule your referral with genetic counselor    Thank you for choosing [facility] for your healthcare.   As always if you have any questions or concerns please do not hesitate to call our office at [phone] or through [employer].    Have a great day!  Nohemy Rojas, DO

## 2024-12-17 NOTE — ASSESSMENT & PLAN NOTE
GAD7 score 20 suggestive of severe anxiety  Uncontrolled currently due to stressors discussed increasing BuSpar to twice a day to see if that helps  Weaning off of Paxil and is now down to 5 mg  Orders:    busPIRone (Buspar) 10 mg tablet; Take 1 tablet (10 mg) by mouth 2 times a day.

## 2024-12-17 NOTE — ASSESSMENT & PLAN NOTE
PHQ-9 score of 12 suggestive of moderate depression  Some improvement with Wellbutrin, continue  Truly weaning off of Paxil and is now on 5 mg    Orders:    Comprehensive metabolic panel; Future

## 2024-12-17 NOTE — PROGRESS NOTES
"Subjective   Patient ID: Deanna Davis is a 44 y.o. female who presents for Depression and Anxiety.  At her last visit I started weaning her off Paxil which she had been on for years and started her on Wellbutrin.  She is also on BuSpar.  She has been having a difficult time with withdrawal symptoms from the Paxil and I slow down her taper.  She is currently on 5 mg once a day of Paxil.  She reports that her energy level is better with the Wellbutrin.  She currently has a lot of stressors including her aging cats that have health issues.          Patient Care Team:  Nohemy Rojas DO as PCP - General  Farzana Richmond MD as Consulting Physician (Obstetrics and Gynecology)    Current Outpatient Medications   Medication Sig Dispense Refill    buPROPion XL (Wellbutrin XL) 150 mg 24 hr tablet Take 1 tablet (150 mg) by mouth once daily in the morning. Do not crush, chew, or split. 30 tablet 0    spironolactone (Aldactone) 100 mg tablet Take 1 tablet (100 mg) by mouth once daily.      busPIRone (Buspar) 10 mg tablet Take 1 tablet (10 mg) by mouth 2 times a day. 180 tablet 3    PARoxetine (PaxiL) 10 mg tablet Take 0.5 tablets (5 mg) by mouth once daily in the morning.       No current facility-administered medications for this visit.       Objective     Visit Vitals  /72 (BP Location: Right arm, Patient Position: Sitting, BP Cuff Size: Adult)   Pulse 84   Temp 36 °C (96.8 °F) (Temporal)   Resp 20   Ht 1.588 m (5' 2.5\")   Wt 64.9 kg (143 lb)   SpO2 99%   BMI 25.74 kg/m²   Smoking Status Never   BSA 1.69 m²        Constitutional: Well nourished, well developed, appears stated age  Eyes: no scleral icterus, no conjunctival injection  Respiratory: normal respiratory effort  Musculoskeletal: No gross deformities appreciated  Skin: Warm, dry.  Neurologic: Alert, CNs II-XII grossly intact..  Psych: Appropriate mood and affect.        Assessment & Plan  Moderate recurrent major depression  PHQ-9 score of 12 suggestive of " moderate depression  Some improvement with Wellbutrin, continue  Truly weaning off of Paxil and is now on 5 mg    Orders:    Comprehensive metabolic panel; Future    Family history of breast cancer    Orders:    Referral to Genetics; Future    Family history of Alzheimer disease    Orders:    Referral to Genetics; Future    Generalized anxiety disorder  GAD7 score 20 suggestive of severe anxiety  Uncontrolled currently due to stressors discussed increasing BuSpar to twice a day to see if that helps  Weaning off of Paxil and is now down to 5 mg  Orders:    busPIRone (Buspar) 10 mg tablet; Take 1 tablet (10 mg) by mouth 2 times a day.    Leukopenia, unspecified type    Orders:    CBC and Auto Differential; Future       Follow up 6 months.     Nohemy Rojas, DO

## 2024-12-18 ENCOUNTER — PATIENT MESSAGE (OUTPATIENT)
Dept: PRIMARY CARE | Facility: CLINIC | Age: 44
End: 2024-12-18
Payer: COMMERCIAL

## 2024-12-18 ENCOUNTER — PATIENT MESSAGE (OUTPATIENT)
Dept: PRIMARY CARE | Facility: CLINIC | Age: 44
End: 2024-12-18

## 2024-12-18 DIAGNOSIS — E66.3 OVERWEIGHT (BMI 25.0-29.9): Primary | ICD-10-CM

## 2025-01-07 ENCOUNTER — APPOINTMENT (OUTPATIENT)
Facility: CLINIC | Age: 45
End: 2025-01-07
Payer: COMMERCIAL

## 2025-01-07 DIAGNOSIS — Z71.3 DIETARY COUNSELING AND SURVEILLANCE: Primary | ICD-10-CM

## 2025-01-07 DIAGNOSIS — E66.3 OVERWEIGHT (BMI 25.0-29.9): ICD-10-CM

## 2025-01-07 DIAGNOSIS — Z76.89 ENCOUNTER FOR WEIGHT MANAGEMENT: ICD-10-CM

## 2025-01-07 PROCEDURE — 97802 MEDICAL NUTRITION INDIV IN: CPT

## 2025-01-07 NOTE — PROGRESS NOTES
"NUTRITION ASSESSMENT NOTE    Reason for Nutrition Visit:  Pt is a 44 y.o. female being seen in person for an initial appointment at [facility] referred for overweight.        Anthropometrics:  Wt: 143#, 64.9kg  Ht: 5'2\"  BMI: 25.74 kg/m2      Past Medical Hx:  Patient Active Problem List   Diagnosis    Moderate recurrent major depression    Generalized anxiety disorder    Annual physical exam    Skin-picking disorder    Uterine leiomyoma          Lab Results   Component Value Date    CHOL 157 06/22/2024    TRIG 64 06/22/2024          Food and Nutrition Hx:  Pt states they seek  from dietitian for help addressing wt status with emphasis on wt loss of ~15# - this is pt's first time working with a dietitian. Pt begins by stating she has a complicated wt hx that began when she was an overweight child, where she then began her first \"diet\" at [employer] in the 1990's - pt learned basic concepts of eating and nutrition. Pt found success in this program and lost ~40#. Pt kept wt off for a significant period of time, but over the course of ~30 yrs pt wt status crept up and she began a series of yo'yo diets via [employer], [employer], Lela La[employer], [employer] Calorie logging, Keto/low carb, and low carb/high protein - pt lost 70# with the moderate/low carb and high protein and vegetables. Pt has staved off substantial wt gain but notes that she is approaching a breaking period, which has led her to seek professional nutritional help.     Pt then mentions that she was working with a mental health counselor, was then recommended to work with the [facility]. Pt felt a connection with the intuitive eating center, met with a therapist, but pt notes that this therapist was not the right fit. Pt was then provided a website link for another intuitive eating provider, but after research pt felt that it might not be a good fit given the tenants of intuitive eating. Pt is now confused about what " to do with her diet and her wt status. Pt asserts that she does not have food cues, does not know when she feels full or when to stop eating, pt does admit to having disordered eating tendencies and/or disordered thinking when it comes to food in general. There is a lot of conflict within pt as to what is healthy with food and what is not - has been told fat is both bad and good, has been told carbohydrates are both good and bad.     RDN provided full array of medical nutrition therapy (MNT) recommendations and interventions with specific governance over wt management, balanced and adequate nutrition.     DIETARY RECALL: *Pt consumes an average of 3 meals/day* w/ 2 snacks  Wake-up: ~5:30am-6am (weekdays), ~7am-8am (weekends)  Meal 1: Everyday, ~7:15am-7:45am, Keto bread x 2 slices w/ low sugar PB or PB2 powder  Meal 2: Everyday, ~12pm, Chicken sausage w/ frozen vegetables  Snack: Some days, Quest bars, celery, cucumbers, carrots, dip (hummus, morrissey-based, dressing), Greek yogurt w/ blueberries/low sugar granola  Meal 3: Everyday, ~6pm-7:30pm, Chicken breast, salad, vegetables, stir wright w/ shitake pasta, pork, seafood (shrimp), fish (salmon), ground beef (lean), ground turkey  Snack: Evenings, Protein powder pudding, Halo low carb options  Beverages: Water x ~64oz, coffee, diet soda pop, seltzer water      NUTRITIONAL ARTIFACTS:  Does not eat fast food and does not eat out often -- used to drink a great deal of beer and wine, no longer consumes with frequency    Allergies: None  Intolerance: None  Appetite: Good  Intake: >75%    Supplements: Denies     Energy Levels: High    Food Preparation: Partner/Spouse -- Slight food prep by pt  Cooking Skills/Barriers: None reported       Nutrition Focused Physical Exam:    Performed/Deferred: Deferred as pt visually appears well-nourished with no signs of malnutrition    Estimated Energy Needs:    *Pt BMI @ 25.74 kg/m2*  WEIGHT LOSS: 25-28 kcal/kg IBW = 7625-4268  kcal/day  PROTEIN Needs: 0.8-1 g/kg = 50-65 g/day    Nutrition Diagnosis:    Diagnosis Statement 1:  Diagnosis Status: New  Diagnosis : Disordered eating pattern related to psychological causes that put emphasis on food, weight, or shape as evidenced by  pt admission of having disordered eating tendencies with restrictions of various types of nutrients and foods and pt admission of being told by intuitive eating therapist that she needs to work with eating disorder specific teams to amply address such tendencies and beliefs    Diagnosis Statement 2:  Diagnosis Status: New  Diagnosis : Inadequate carbohydrate intake  related to psychological causes that put emphasis on food, weight, or shape as evidenced by  pt dietary recall reflecting daily intake of protein at levels <25-50% daily recommended amount of carbohydrates    Diagnosis Statement 3:   Diagnosis Status: New  Diagnosis : Food and nutrition related knowledge deficit related to lack of or limited prior nutrition-related education as evidenced by verbalizes inaccurate or incomplete knowledge    Nutrition Interventions:  Anti-Inflammatory Diet, Consistent Carbohydrate Diet, Increased Carbohydrate Diet, Increased Fiber Diet, Increased Fluid Intake, Increased Soluble Fiber, Increased Omega-3 Diet, Increased Protein Diet, Low Saturated Fat Diet, Mediterranean Diet, and Plant Based Diet  Nutrition Counseling: Motivational Interviewing, Goal Setting, and Health Belief Model  Coordination of Care: None    Nutrition Recommendations:  1) Begin slowly adding carbohydrates to diet pattern with emphasis on whole, natural, complex carbohydrates (refer to ADA my plate document for examples) with the goal of having ~15-30 grams per meal. Additions to higher carbohydrate amounts per each meal will increase through time over a period of weeks and months. Aim to start with one serving of fruit per day.  2) Work on bringing balance to meal structure - lean proteins, healthy  fats, complex carbs, plant fiber. Most meals (>50%) should be balanced.     Educational Handouts: ADA My Plate, Protein Content of Foods List, 30DMDMP, ACLM FAMJS, ACLM NB's, Fruit A-Z, Whole Grains A-Z, Omega - 3 MNT x 2

## 2025-01-21 ASSESSMENT — ANXIETY QUESTIONNAIRES
6. BECOMING EASILY ANNOYED OR IRRITABLE: MORE THAN HALF THE DAYS
4. TROUBLE RELAXING: SEVERAL DAYS
7. FEELING AFRAID AS IF SOMETHING AWFUL MIGHT HAPPEN: SEVERAL DAYS
7. FEELING AFRAID AS IF SOMETHING AWFUL MIGHT HAPPEN: SEVERAL DAYS
3. WORRYING TOO MUCH ABOUT DIFFERENT THINGS: MORE THAN HALF THE DAYS
5. BEING SO RESTLESS THAT IT IS HARD TO SIT STILL: NOT AT ALL
IF YOU CHECKED OFF ANY PROBLEMS ON THIS QUESTIONNAIRE, HOW DIFFICULT HAVE THESE PROBLEMS MADE IT FOR YOU TO DO YOUR WORK, TAKE CARE OF THINGS AT HOME, OR GET ALONG WITH OTHER PEOPLE: SOMEWHAT DIFFICULT
2. NOT BEING ABLE TO STOP OR CONTROL WORRYING: SEVERAL DAYS
5. BEING SO RESTLESS THAT IT IS HARD TO SIT STILL: NOT AT ALL
GAD7 TOTAL SCORE: 9
4. TROUBLE RELAXING: SEVERAL DAYS
6. BECOMING EASILY ANNOYED OR IRRITABLE: MORE THAN HALF THE DAYS
1. FEELING NERVOUS, ANXIOUS, OR ON EDGE: MORE THAN HALF THE DAYS
3. WORRYING TOO MUCH ABOUT DIFFERENT THINGS: MORE THAN HALF THE DAYS
IF YOU CHECKED OFF ANY PROBLEMS ON THIS QUESTIONNAIRE, HOW DIFFICULT HAVE THESE PROBLEMS MADE IT FOR YOU TO DO YOUR WORK, TAKE CARE OF THINGS AT HOME, OR GET ALONG WITH OTHER PEOPLE: SOMEWHAT DIFFICULT
2. NOT BEING ABLE TO STOP OR CONTROL WORRYING: SEVERAL DAYS
1. FEELING NERVOUS, ANXIOUS, OR ON EDGE: MORE THAN HALF THE DAYS

## 2025-01-21 ASSESSMENT — PATIENT HEALTH QUESTIONNAIRE - PHQ9
4. FEELING TIRED OR HAVING LITTLE ENERGY: SEVERAL DAYS
2. FEELING DOWN, DEPRESSED, IRRITABLE, OR HOPELESS: SEVERAL DAYS
1. LITTLE INTEREST OR PLEASURE IN DOING THINGS: MORE THAN HALF THE DAYS
1. LITTLE INTEREST OR PLEASURE IN DOING THINGS: 2
4. FEELING TIRED OR HAVING LITTLE ENERGY: SEVERAL DAYS
9. THOUGHTS THAT YOU WOULD BE BETTER OFF DEAD, OR OF HURTING YOURSELF: NOT AT ALL
3. TROUBLE FALLING OR STAYING ASLEEP OR SLEEPING TOO MUCH: MORE THAN HALF THE DAYS
10. IF YOU CHECKED OFF ANY PROBLEMS, HOW DIFFICULT HAVE THESE PROBLEMS MADE IT FOR YOU TO DO YOUR WORK, TAKE CARE OF THINGS AT HOME, OR GET ALONG WITH OTHER PEOPLE: SOMEWHAT DIFFICULT
1. LITTLE INTEREST OR PLEASURE IN DOING THINGS: MORE THAN HALF THE DAYS
7. TROUBLE CONCENTRATING ON THINGS, SUCH AS READING THE NEWSPAPER OR WATCHING TELEVISION: SEVERAL DAYS
6. FEELING BAD ABOUT YOURSELF - OR THAT YOU ARE A FAILURE OR HAVE LET YOURSELF OR YOUR FAMILY DOWN: 1
6. FEELING BAD ABOUT YOURSELF - OR THAT YOU ARE A FAILURE OR HAVE LET YOURSELF OR YOUR FAMILY DOWN: SEVERAL DAYS
8. MOVING OR SPEAKING SO SLOWLY THAT OTHER PEOPLE COULD HAVE NOTICED. OR THE OPPOSITE, BEING SO FIGETY OR RESTLESS THAT YOU HAVE BEEN MOVING AROUND A LOT MORE THAN USUAL: 0
SUM OF ALL RESPONSES TO PHQ QUESTIONS 1-9: 9
3. TROUBLE FALLING OR STAYING ASLEEP OR SLEEPING TOO MUCH: 2
7. TROUBLE CONCENTRATING ON THINGS, SUCH AS READING THE NEWSPAPER OR WATCHING TELEVISION: SEVERAL DAYS
4. FEELING TIRED OR HAVING LITTLE ENERGY: 1
8. MOVING OR SPEAKING SO SLOWLY THAT OTHER PEOPLE COULD HAVE NOTICED. OR THE OPPOSITE, BEING SO FIGETY OR RESTLESS THAT YOU HAVE BEEN MOVING AROUND A LOT MORE THAN USUAL: NOT AT ALL
2. FEELING DOWN, DEPRESSED, IRRITABLE, OR HOPELESS: 1
7. TROUBLE CONCENTRATING ON THINGS, SUCH AS READING THE NEWSPAPER OR WATCHING TELEVISION: 1
9. THOUGHTS THAT YOU WOULD BE BETTER OFF DEAD, OR OF HURTING YOURSELF: 0
5. POOR APPETITE OR OVEREATING: SEVERAL DAYS
3. TROUBLE FALLING OR STAYING ASLEEP OR SLEEPING TOO MUCH: MORE THAN HALF THE DAYS
10. IF YOU CHECKED OFF ANY PROBLEMS, HOW DIFFICULT HAVE THESE PROBLEMS MADE IT FOR YOU TO DO YOUR WORK, TAKE CARE OF THINGS AT HOME, OR GET ALONG WITH OTHER PEOPLE: SOMEWHAT DIFFICULT
2. FEELING DOWN, DEPRESSED OR HOPELESS: SEVERAL DAYS
SUM OF ALL RESPONSES TO PHQ QUESTIONS 1-9: 9
9. THOUGHTS THAT YOU WOULD BE BETTER OFF DEAD, OR OF HURTING YOURSELF: NOT AT ALL
6. FEELING BAD ABOUT YOURSELF - OR THAT YOU ARE A FAILURE OR HAVE LET YOURSELF OR YOUR FAMILY DOWN: SEVERAL DAYS
5. POOR APPETITE OR OVEREATING: SEVERAL DAYS
5. POOR APPETITE OR OVEREATING: 1
8. MOVING OR SPEAKING SO SLOWLY THAT OTHER PEOPLE COULD HAVE NOTICED. OR THE OPPOSITE, BEING SO FIGETY OR RESTLESS THAT YOU HAVE BEEN MOVING AROUND A LOT MORE THAN USUAL: NOT AT ALL

## 2025-01-23 ENCOUNTER — HOSPITAL ENCOUNTER (OUTPATIENT)
Dept: RADIOLOGY | Facility: CLINIC | Age: 45
Discharge: HOME | End: 2025-01-23
Payer: COMMERCIAL

## 2025-01-23 ENCOUNTER — OFFICE VISIT (OUTPATIENT)
Dept: PRIMARY CARE | Facility: CLINIC | Age: 45
End: 2025-01-23
Payer: COMMERCIAL

## 2025-01-23 VITALS
OXYGEN SATURATION: 96 % | HEIGHT: 63 IN | DIASTOLIC BLOOD PRESSURE: 72 MMHG | HEART RATE: 89 BPM | SYSTOLIC BLOOD PRESSURE: 108 MMHG | TEMPERATURE: 96.9 F | BODY MASS INDEX: 26.28 KG/M2 | RESPIRATION RATE: 20 BRPM | WEIGHT: 148.3 LBS

## 2025-01-23 DIAGNOSIS — J20.9 ACUTE BRONCHITIS, UNSPECIFIED ORGANISM: ICD-10-CM

## 2025-01-23 DIAGNOSIS — R05.1 ACUTE COUGH: ICD-10-CM

## 2025-01-23 DIAGNOSIS — J20.9 ACUTE BRONCHITIS, UNSPECIFIED ORGANISM: Primary | ICD-10-CM

## 2025-01-23 PROCEDURE — 1036F TOBACCO NON-USER: CPT | Performed by: STUDENT IN AN ORGANIZED HEALTH CARE EDUCATION/TRAINING PROGRAM

## 2025-01-23 PROCEDURE — 71046 X-RAY EXAM CHEST 2 VIEWS: CPT

## 2025-01-23 PROCEDURE — 99213 OFFICE O/P EST LOW 20 MIN: CPT | Performed by: STUDENT IN AN ORGANIZED HEALTH CARE EDUCATION/TRAINING PROGRAM

## 2025-01-23 PROCEDURE — 3008F BODY MASS INDEX DOCD: CPT | Performed by: STUDENT IN AN ORGANIZED HEALTH CARE EDUCATION/TRAINING PROGRAM

## 2025-01-23 RX ORDER — BENZONATATE 100 MG/1
100 CAPSULE ORAL 3 TIMES DAILY PRN
Qty: 30 CAPSULE | Refills: 0 | Status: SHIPPED | OUTPATIENT
Start: 2025-01-23 | End: 2025-02-22

## 2025-01-23 RX ORDER — LEVALBUTEROL TARTRATE 45 UG/1
2 AEROSOL, METERED ORAL EVERY 6 HOURS PRN
Qty: 15 G | Refills: 0 | Status: SHIPPED | OUTPATIENT
Start: 2025-01-23

## 2025-01-23 RX ORDER — DOXYCYCLINE 100 MG/1
100 CAPSULE ORAL 2 TIMES DAILY
Qty: 20 CAPSULE | Refills: 0 | Status: SHIPPED | OUTPATIENT
Start: 2025-01-23 | End: 2025-02-02

## 2025-01-23 RX ORDER — ALBUTEROL SULFATE 90 UG/1
2 INHALANT RESPIRATORY (INHALATION) EVERY 4 HOURS PRN
Qty: 8 G | Refills: 0 | Status: SHIPPED | OUTPATIENT
Start: 2025-01-23 | End: 2025-01-23

## 2025-01-23 SDOH — ECONOMIC STABILITY: FOOD INSECURITY: WITHIN THE PAST 12 MONTHS, YOU WORRIED THAT YOUR FOOD WOULD RUN OUT BEFORE YOU GOT MONEY TO BUY MORE.: NEVER TRUE

## 2025-01-23 SDOH — ECONOMIC STABILITY: FOOD INSECURITY: WITHIN THE PAST 12 MONTHS, THE FOOD YOU BOUGHT JUST DIDN'T LAST AND YOU DIDN'T HAVE MONEY TO GET MORE.: NEVER TRUE

## 2025-01-23 ASSESSMENT — ENCOUNTER SYMPTOMS
SHORTNESS OF BREATH: 0
NAUSEA: 0
CONSTIPATION: 0
DIARRHEA: 0
COUGH: 1
MUSCULOSKELETAL NEGATIVE: 1
CONFUSION: 0
CHILLS: 0
WHEEZING: 0
COLOR CHANGE: 0
DEPRESSION: 0
PALPITATIONS: 0
LOSS OF SENSATION IN FEET: 0
FATIGUE: 0
ABDOMINAL PAIN: 0
OCCASIONAL FEELINGS OF UNSTEADINESS: 0
RHINORRHEA: 1
UNEXPECTED WEIGHT CHANGE: 0
HEADACHES: 0
DIZZINESS: 0
VOMITING: 0
FEVER: 0

## 2025-01-23 ASSESSMENT — PATIENT HEALTH QUESTIONNAIRE - PHQ9
1. LITTLE INTEREST OR PLEASURE IN DOING THINGS: NOT AT ALL
SUM OF ALL RESPONSES TO PHQ9 QUESTIONS 1 & 2: 0
2. FEELING DOWN, DEPRESSED OR HOPELESS: NOT AT ALL

## 2025-01-23 ASSESSMENT — LIFESTYLE VARIABLES
AUDIT-C TOTAL SCORE: 0
HOW MANY STANDARD DRINKS CONTAINING ALCOHOL DO YOU HAVE ON A TYPICAL DAY: PATIENT DOES NOT DRINK
HOW OFTEN DO YOU HAVE A DRINK CONTAINING ALCOHOL: NEVER
SKIP TO QUESTIONS 9-10: 1
HOW OFTEN DO YOU HAVE SIX OR MORE DRINKS ON ONE OCCASION: NEVER

## 2025-01-23 ASSESSMENT — PAIN SCALES - GENERAL: PAINLEVEL_OUTOF10: 0-NO PAIN

## 2025-01-23 NOTE — PROGRESS NOTES
"Subjective   Patient ID: Deanna Davis is a 44 y.o. female who presents for URI (Sore throat (in the beginning is now gone),  no earaches, no fever, no nausea. Coughing (productive not sure of color), having coughing fits,  runny nose, and congestion.   Covid 2 test negative both times.  OTC - Nyquil and cough drops.  - 1 1/2 week. ).    HPI   VF here for sick visit. Reports she has been sick x 2 wks now; started with sore throat, cough, HA & congestion which got sl better but now having coughing fits, occa bringing up phlegm. Also feels congested & has rhinorrhea & feels like its getting sl worse. Denies sinus pressure. Taking nyquil for sleep now. She tested for COVID twice and were both negative.     Review of Systems   Constitutional:  Negative for chills, fatigue, fever and unexpected weight change.   HENT:  Positive for congestion and rhinorrhea.    Respiratory:  Positive for cough. Negative for shortness of breath and wheezing.    Cardiovascular:  Negative for chest pain, palpitations and leg swelling.   Gastrointestinal:  Negative for abdominal pain, constipation, diarrhea, nausea and vomiting.   Musculoskeletal: Negative.    Skin:  Negative for color change and rash.   Neurological:  Negative for dizziness and headaches.   Psychiatric/Behavioral:  Negative for behavioral problems and confusion.        Objective   /72 (BP Location: Left arm, Patient Position: Sitting, BP Cuff Size: Adult)   Pulse 89   Temp 36.1 °C (96.9 °F) (Temporal)   Resp 20   Ht 1.588 m (5' 2.5\")   Wt 67.3 kg (148 lb 4.8 oz)   SpO2 96%   BMI 26.69 kg/m²     Physical Exam  Vitals and nursing note reviewed.   Constitutional:       Appearance: Normal appearance.      Comments: Sl sick appearing female with intermittent cough    HENT:      Right Ear: Tympanic membrane normal.      Left Ear: Tympanic membrane normal.      Nose: Congestion present.      Right Sinus: No maxillary sinus tenderness or frontal sinus tenderness.      " Left Sinus: No maxillary sinus tenderness or frontal sinus tenderness.   Cardiovascular:      Rate and Rhythm: Normal rate and regular rhythm.      Pulses: Normal pulses.      Heart sounds: Normal heart sounds.   Pulmonary:      Effort: Pulmonary effort is normal.      Breath sounds: Normal breath sounds. No wheezing or rhonchi.   Abdominal:      General: Abdomen is flat. Bowel sounds are normal.      Palpations: Abdomen is soft.   Musculoskeletal:         General: Normal range of motion.   Neurological:      General: No focal deficit present.      Mental Status: She is alert.   Psychiatric:         Mood and Affect: Mood normal.         Behavior: Behavior normal.         Assessment/Plan   VF patient here for sick visit.  Based on the history of presentation and duration (>2 wks), she likely has superimposed bronchitis; will restart doxycycline 100 mg twice daily as below.  Also added Tessalon pearls as needed for cough and albuterol as needed too.  Obtain CXR to rule out pneumonia.  May continue NyQuil as needed for cough.  Recommend Tylenol as needed, encourage hydration.  Seek ER care if symptom worsens.  Problem List Items Addressed This Visit    None  Visit Diagnoses         Codes    Acute bronchitis, unspecified organism    -  Primary J20.9    Relevant Medications    doxycycline (Vibramycin) 100 mg capsule    albuterol (Ventolin HFA) 90 mcg/actuation inhaler    Acute cough     R05.1    Relevant Medications    benzonatate (Tessalon) 100 mg capsule    Other Relevant Orders    XR chest 2 views          This note was partially generated using the Dragon Voice recognition system. There may be some incorrect wording, spelling and/or spelling errors or punctuation errors that were not corrected prior to committing the note to the medical record.      Mook Hernandez MD    [facility]

## 2025-01-24 PROBLEM — E66.3 OVERWEIGHT (BMI 25.0-29.9): Status: ACTIVE | Noted: 2025-01-24

## 2025-01-24 NOTE — PROGRESS NOTES
"Nutrition Follow Up Assessment:     Patient Deanna Davis is a 44 y.o. female being seen at [facility] who was initially referred by Dr. Rojas on 12/19/24 for   1. Overweight (BMI 25.0-29.9)          Last nutrition visit was completed on 1/7/24 with dietitian Mani with previous goals set by RDN & pt:   Begin slowly adding carbohydrates to diet pattern with emphasis on whole, natural, complex carbohydrates (refer to ADA my plate document for examples) with the goal of having ~15-30 grams per meal. Additions to higher carbohydrate amounts per each meal will increase through time over a period of weeks and months. Aim to start with one serving of fruit per day.  Work on bringing balance to meal structure - lean proteins, healthy fats, complex carbs, plant fiber. Most meals (>50%) should be balanced.     Nutrition Assessment      Nutrition History:  Food & Nutrition History: Patient explained to me today that she would like to continue losing more weight to achieve and maintain a weight range of 135-145#. Factors that she finds empowering to healthier/balanced eating include 1) smaller meals throughout the day. eating more frequently helps keep the urge for binging to a minimum 2) staying away from \"processed\" foods / fried foods / greasy foods. 3) seeking to determine how much of the fun foods to include while avoiding the \"what-the-heck\" response, she is self-aware she needs to work on judging foods as good/bad. Since initial meeting with Mani on 1/7/25, she said she has added a greater variety of foods to her diet - Greek yogurt, cottage cheese, whole grain  bread, nuts.    Energy Intake: Good > 75 % (although she was sick in the last two weeks which contributed to lower appetite)      Anthropometrics:    Weight History:   Daily Weight  01/23/25 : 67.3 kg (148 lb 4.8 oz)  12/17/24 : 64.9 kg (143 lb)  06/11/24 : 65.8 kg (145 lb)  12/01/23 : 65.3 kg (144 lb)  06/06/23 : 63.5 kg (140 lb)  05/31/22 : 73.5 kg " "(162 lb)  04/29/21 : 93 kg (205 lb)  07/30/20 : 88.5 kg (195 lb)  04/28/20 : 91.6 kg (202 lb)    Weight Change %:  Weight History / % Weight Change: She reports weight is up about 15# from lowest, and weight varies about 2# up and down. Weight has varied widely in the past, see Mani's initial note for full weight history    Nutrition Focused Physical Exam Findings:  Defer    Nutrition Significant Labs:  CBC Trend:   Recent Labs     12/17/24  1542 06/22/24  0851 07/30/20  1348   WBC 5.2 3.9* 6.7   NRBC 0.0 0.0  --    RBC 4.87 4.59 4.85   HGB 15.0 14.4 14.6   HCT 44.7 42.4 43.9   MCV 92 92 91   MCH 30.8 31.4  --    MCHC 33.6 34.0 33.3   RDW 12.6 13.1 13.1    277 354   , RFP + Serum Mag Trend:   Recent Labs     12/17/24  1542 06/22/24  0851 07/30/20  1348   GLUCOSE 83 76 83   * 137 138   K 4.1 4.6 4.0   CL 99 103 104   CO2 29 30 29   ANIONGAP 10 9* 9*   BUN 28* 29* 10   CREATININE 0.79 0.74 0.81   EGFR >90 >90  --    CALCIUM 9.4 8.9 9.3   ALBUMIN 4.4 4.1 4.1   , LFT Trend:   Recent Labs     12/17/24  1542 06/22/24  0851 07/30/20  1348   ALBUMIN 4.4 4.1 4.1   BILITOT 0.3 0.5 0.5   ALKPHOS 34 27* 71   ALT 7 8 9   AST 13 12 13   PROT 7.5 6.5 7.4   , DM Specific Labs Trend (includes HgbA1C): No results for input(s): \"HGBA1C\", \"CPEPTIDE\" in the last 86774 hours.    No lab exists for component: \"BHDRXBUT\", \"VPW0GTR\", Lipid Panel Trend:    Recent Labs     06/22/24  0851   CHOL 157   HDL 51.3   LDLCALC 93   VLDL 13   TRIG 64   , Iron Panel + Serum Ferritin Trend: No results for input(s): \"IRON\", \"UIBC\", \"TIBC\", \"IRONSAT\", \"FERRITIN\" in the last 97518 hours., Vitamin B12:   Lab Results   Component Value Date    [id number] 718 06/22/2024    , Folate: No results found for: \"FOLATE\" , and Vitamin D: No results found for: \"VITD25\"     Medications:  Current Outpatient Medications   Medication Instructions    benzonatate (TESSALON) 100 mg, oral, 3 times daily PRN, Do not crush or chew.    buPROPion XL (WELLBUTRIN XL) 150 " mg, oral, Every morning, Do not crush, chew, or split.    busPIRone (BUSPAR) 10 mg, oral, 2 times daily    doxycycline (VIBRAMYCIN) 100 mg, oral, 2 times daily, Take with at least 8 ounces (large glass) of water, do not lie down for 30 minutes after    levalbuterol (Xopenex) 45 mcg/actuation inhaler 2 puffs, inhalation, Every 6 hours PRN    PARoxetine (PAXIL) 5 mg, Every morning    spironolactone (ALDACTONE) 100 mg, Daily        Nutrition Diagnosis   Malnutrition Diagnosis  Patient has Malnutrition Diagnosis: No    Nutrition Diagnosis  Patient has Nutrition Diagnosis: Yes  Diagnosis Status (1): Active  Nutrition Diagnosis 1: Disordered eating pattern  Related to (1): psychological causes that put emphasis on food, weight, or shape  As Evidenced by (1): pt admission of having disordered eating tendencies with restrictions of various types of nutrients and foods and pt admission of being told by intuitive eating therapist that she needs to work with eating disorder specific teams to amply address such tendencies and beliefs  Additional Nutrition Diagnosis: Diagnosis 3  Diagnosis Status (2): Active  Nutrition Diagnosis 2: Inadequate carbohydrate intake  Related to (2): psychological causes that put emphasis on food, weight, or shape  As Evidenced by (2): pt dietary recall reflecting daily intake of protein at levels <25-50% daily recommended amount of carbohydrates  Diagnosis Status (3): Active  Nutrition Diagnosis 3: Food and nutrition related knowledge deficit  Related to (3): ack of or limited prior nutrition-related education  As Evidenced by (3): verbalizes inaccurate or incomplete knowledge     Nutrition Interventions/Recommendations   Nutrition Prescription: Oral nutrition balanced nutrition with plate method    Nutrition Interventions:   Food and Nutrient Delivery: Meals & Snacks: Modify composition of oral intake, Modification of schedule of oral intake     Coordination of Care: Collaboration and referral of  "nutrition care:  (N/A)     Nutrition Education:     Nutrition Education Topics Discussed:   Introduced her to the EBONY model to build structure in her diet without adding more restrictive food rules. Advised that Intuitive Eating is a useful tool/model to strive for, but it is not as simple as \"stop when you're full.\" Advised she can start first with focusing on regularity of meals and adequacy of meals to honor hunger. Told her about what that means:  - adhere to eating a minimum of 3 times per day, and more ideally about 5-6 times per day (3 meals, 2-3 snacks if needed)  - balance the type of food and amount of food at meals with the Plate Method. Showed her the 33% plate method. Advised if she doesn't complete the whole plate at a meal, she could use the remaining sections to help guide choices at a snack.     Educational Handouts Provided: EBONY overview, 33% plate method, 50/25/25 plate method    Nutrition Counseling:   Nutrition Counseling Strategies : Nutrition counseling based on motivational interviewing strategy      Readiness to Change : Good  Level of Understanding : Good  Anticipated Compliant : Good         Nutrition Monitoring and Evaluation   Food and Nutrient Intake  Monitoring and Evaluation Plan: Meal/snack pattern, Amount of food  Amount of Food: Estimated amout of food, Types of food  Criteria: Use the Plate Method to guide amount/type of foods to choose at meals  Meal/Snack Pattern: Estimated meal and snack pattern  Criteria: Eat a minimum of 3 times per day, and more ideally 3 meals + 2-3 snacks                             Follow Up: 2/10 at 3:30 virtual visit    Time Spent  Prep time on day of patient encounter: 10 minutes  Time spent directly with patient, family or caregiver: 36 minutes  Additional Time Spent on Patient Care Activities: 0 minutes  Documentation Time: 7 minutes  Other Time Spent: 0 minutes  Total: 53 minutes      "

## 2025-01-27 ENCOUNTER — TELEPHONE (OUTPATIENT)
Dept: PRIMARY CARE | Facility: CLINIC | Age: 45
End: 2025-01-27

## 2025-01-27 ENCOUNTER — APPOINTMENT (OUTPATIENT)
Dept: NUTRITION | Facility: CLINIC | Age: 45
End: 2025-01-27
Payer: COMMERCIAL

## 2025-01-27 DIAGNOSIS — E66.3 OVERWEIGHT (BMI 25.0-29.9): Primary | ICD-10-CM

## 2025-01-27 PROCEDURE — 97803 MED NUTRITION INDIV SUBSEQ: CPT | Performed by: DIETITIAN, REGISTERED

## 2025-01-27 NOTE — PATIENT INSTRUCTIONS
"Introduced her to the [employer] model to build structure in her diet without adding more restrictive food rules. Advised that Intuitive Eating is a useful tool/model to strive for, but it is not as simple as \"stop when you're full.\" Advised she can start first with focusing on regularity of meals and adequacy of meals to honor hunger. Told her about what that means:  - adhere to eating a minimum of 3 times per day, and more ideally about 5-6 times per day (3 meals, 2-3 snacks if needed)  - balance the type of food and amount of food at meals with the Plate Method. Showed her the 33% plate method. Advised if she doesn't complete the whole plate at a meal, she could use the remaining sections to help guide choices at a snack.   "

## 2025-01-27 NOTE — TELEPHONE ENCOUNTER
----- Message from Mook Hernandez sent at 1/26/2025  7:12 PM EST -----  CXR: normal chest Xray result. Cont plan/meds as discussed during office visit. Seek ER care if sx worsens -Dr. Hernandez

## 2025-01-29 ENCOUNTER — TELEPHONE (OUTPATIENT)
Dept: PRIMARY CARE | Facility: CLINIC | Age: 45
End: 2025-01-29
Payer: COMMERCIAL

## 2025-01-29 DIAGNOSIS — J20.9 ACUTE BRONCHITIS, UNSPECIFIED ORGANISM: Primary | ICD-10-CM

## 2025-01-29 RX ORDER — AZITHROMYCIN 250 MG/1
TABLET, FILM COATED ORAL
Qty: 6 TABLET | Refills: 0 | Status: SHIPPED | OUTPATIENT
Start: 2025-01-29

## 2025-02-10 ENCOUNTER — APPOINTMENT (OUTPATIENT)
Dept: NUTRITION | Facility: CLINIC | Age: 45
End: 2025-02-10
Payer: COMMERCIAL

## 2025-02-10 VITALS — BODY MASS INDEX: 26.33 KG/M2 | WEIGHT: 148.6 LBS | HEIGHT: 63 IN

## 2025-02-10 DIAGNOSIS — E66.3 OVERWEIGHT (BMI 25.0-29.9): Primary | ICD-10-CM

## 2025-02-10 PROCEDURE — 97803 MED NUTRITION INDIV SUBSEQ: CPT | Performed by: DIETITIAN, REGISTERED

## 2025-02-10 NOTE — PATIENT INSTRUCTIONS
Discussed that setting an alarm could help her to  remember to eat at regular intervals - 10, 12, 2.   Talked about packing simple snacks to eat while short on time at work.   Discussed continuing to minimize alcohol intake.   Created a meal plan and email/mailed it to her.

## 2025-02-10 NOTE — PROGRESS NOTES
"Nutrition Follow Up Assessment:     Patient Deanna Davis is a 44 y.o. female being seen via virtual visit  who was initially referred by Dr. Rojas on 12/19/24 for   1. Overweight (BMI 25.0-29.9)          Last nutrition visit was completed on 1/27/25 with previous goals set by RDN & pt:   Use the Plate Method to guide amount/type of foods to choose at meals  Eat a minimum of 3 times per day, and more ideally 3 meals + 2-3 snacks    Nutrition Assessment      Nutrition History:  Food & Nutrition History: She continues to fill her pantry with healthy foods. She said something new she tried recently was to \"give myself a litle slack.\" For example, she ordered a burger recently when she was dining out, and she didn't under-eat the next day to make up for it. 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  Food Allergies:  (none);  Food Intolerances: none  Sleep Habits:  (has been using wind-downs on [employer])    Diet Recall:  Meal 1: B: thin sliced Grain-tastic toast with an apple.  Snack 1: 10 am - missing it lately  Meal 2: L: 12, half of a plate of frozen broccoli and 1 chicken sausage, and a small serving of tabouleh salad  Snack 2: 2 pm - has been skipping this snack lately, but typically eats baby carrots and hummus, or cucumber with Greek yogurt dip, or almonds.  Meal 3: D: didn't discuss specifics of dinner today  Fluid Intake: had not been drinking any alcohol for a while, but occasionally has been having a glass of red wine lately to help with wind down.  Energy Intake: Good > 75 %      Food Preparation:  Cooking: Patient, Spouse/Significant Other  Grocery Shopping: Patient    Physical Activity:   didn't discuss activity today    Anthropometrics:  Height: 158.8 cm (5' 2.5\")   Weight: 67.4 kg (148 lb 9.6 oz)   BMI (Calculated): 26.73                 Weight History:   Daily Weight  02/10/25 : 67.4 kg (148 lb 9.6 " "oz)  01/23/25 : 67.3 kg (148 lb 4.8 oz)  12/17/24 : 64.9 kg (143 lb)  06/11/24 : 65.8 kg (145 lb)  12/01/23 : 65.3 kg (144 lb)  06/06/23 : 63.5 kg (140 lb)  05/31/22 : 73.5 kg (162 lb)  04/29/21 : 93 kg (205 lb)  07/30/20 : 88.5 kg (195 lb)  04/28/20 : 91.6 kg (202 lb)    Weight Change %:  Weight History / % Weight Change: She reports weight is up about 15# from lowest, and weight varies about 2# up and down. Weight has varied widely in the past, see Mani's initial note for full weight history    Nutrition Focused Physical Exam Findings:  Defer due to virtual visit    Nutrition Significant Labs:  CBC Trend:   Recent Labs     12/17/24  1542 06/22/24  0851 07/30/20  1348   WBC 5.2 3.9* 6.7   NRBC 0.0 0.0  --    RBC 4.87 4.59 4.85   HGB 15.0 14.4 14.6   HCT 44.7 42.4 43.9   MCV 92 92 91   MCH 30.8 31.4  --    MCHC 33.6 34.0 33.3   RDW 12.6 13.1 13.1    277 354   , RFP + Serum Mag Trend:   Recent Labs     12/17/24  1542 06/22/24  0851 07/30/20  1348   GLUCOSE 83 76 83   * 137 138   K 4.1 4.6 4.0   CL 99 103 104   CO2 29 30 29   ANIONGAP 10 9* 9*   BUN 28* 29* 10   CREATININE 0.79 0.74 0.81   EGFR >90 >90  --    CALCIUM 9.4 8.9 9.3   ALBUMIN 4.4 4.1 4.1   , LFT Trend:   Recent Labs     12/17/24  1542 06/22/24  0851 07/30/20  1348   ALBUMIN 4.4 4.1 4.1   BILITOT 0.3 0.5 0.5   ALKPHOS 34 27* 71   ALT 7 8 9   AST 13 12 13   PROT 7.5 6.5 7.4   , DM Specific Labs Trend (includes HgbA1C): No results for input(s): \"HGBA1C\", \"CPEPTIDE\" in the last 74107 hours.    No lab exists for component: \"BHDRXBUT\", \"AZX8HXD\", Lipid Panel Trend:    Recent Labs     06/22/24  0851   CHOL 157   HDL 51.3   LDLCALC 93   VLDL 13   TRIG 64   , Iron Panel + Serum Ferritin Trend: No results for input(s): \"IRON\", \"UIBC\", \"TIBC\", \"IRONSAT\", \"FERRITIN\" in the last 93108 hours., Vitamin B12:   Lab Results   Component Value Date    [id number] 718 06/22/2024    , Folate: No results found for: \"FOLATE\" , and Vitamin D: No results found for: " "\"VITD25\"     Medications:  Current Outpatient Medications   Medication Instructions    azithromycin (Zithromax) 250 mg tablet Take 2 tabs (500 mg) by mouth today, than 1 daily for 4 days.    benzonatate (TESSALON) 100 mg, oral, 3 times daily PRN, Do not crush or chew.    buPROPion XL (WELLBUTRIN XL) 150 mg, oral, Every morning, Do not crush, chew, or split.    busPIRone (BUSPAR) 10 mg, oral, 2 times daily    levalbuterol (Xopenex) 45 mcg/actuation inhaler 2 puffs, inhalation, Every 6 hours PRN    PARoxetine (PAXIL) 5 mg, Every morning    spironolactone (ALDACTONE) 100 mg, Daily        Estimated Needs:  Total Energy Estimated Needs in 24 hours (kCal): 1503 kCal; Method for Estimating Needs: REE x 1.4 - 300 kcal for calorie deficit  Total Protein Estimated Needs in 24 Hours (g): 67.27 g; Method for Estimating 24 Hour Protein Needs: 1 g/gk   ;     ;                 Nutrition Diagnosis   Malnutrition Diagnosis  Patient has Malnutrition Diagnosis: No    Nutrition Diagnosis  Patient has Nutrition Diagnosis: Yes  Diagnosis Status (1): Active  Nutrition Diagnosis 1: Disordered eating pattern  Related to (1): psychological causes that put emphasis on food, weight, or shape  As Evidenced by (1): pt admission of having disordered eating tendencies with restrictions of various types of nutrients and foods and pt admission of being told by intuitive eating therapist that she needs to work with eating disorder specific teams to amply address such tendencies and beliefs  Additional Nutrition Diagnosis: Diagnosis 3  Diagnosis Status (2): Active  Nutrition Diagnosis 2: Inadequate carbohydrate intake  Related to (2): psychological causes that put emphasis on food, weight, or shape  As Evidenced by (2): pt dietary recall reflecting daily intake of protein at levels <25-50% daily recommended amount of carbohydrates  Diagnosis Status (3): Active  Nutrition Diagnosis 3: Food and nutrition related knowledge deficit  Related to (3): ack of " or limited prior nutrition-related education  As Evidenced by (3): verbalizes inaccurate or incomplete knowledge       Nutrition Interventions/Recommendations   Nutrition Prescription: Oral nutrition Reduced calorie regular diet    Nutrition Interventions:   Food and Nutrient Delivery: Meals & Snacks: Modification of schedule of oral intake  Goals: 1) She will set alarms to remind her to eat on regular basis     Coordination of Care: Collaboration and referral of nutrition care:  (N/A)     Nutrition Education:   Nutrition Education Content: Content related nutrition education    Educational Handouts Provided: 1500/70 meal plan AI generated and 4 pre-printed from 5970-7554 kcal    Nutrition Counseling:   Nutrition Counseling Strategies : Nutrition counseling based on motivational interviewing strategy    Readiness to Change : Good  Level of Understanding : Good  Anticipated Compliant : Good         Nutrition Monitoring and Evaluation   Food and Nutrient Intake  Monitoring and Evaluation Plan: Meal/snack pattern, Energy intake  Energy Intake: Estimated energy intake  Criteria: about 1500 calories/day  Meal/Snack Pattern: Estimated meal and snack pattern  Criteria: Eat a minimum of 3 times per day, and more ideally 3 meals + 2-3 snacks                             Follow Up: 3/3 at 4 pm virtual    Time Spent  Prep time on day of patient encounter: 5 minutes  Time spent directly with patient, family or caregiver: 30 minutes  Additional Time Spent on Patient Care Activities: 10 minutes  Documentation Time: 7 minutes  Other Time Spent: 0 minutes  Total: 52 minutes

## 2025-02-18 ENCOUNTER — PATIENT MESSAGE (OUTPATIENT)
Dept: PRIMARY CARE | Facility: CLINIC | Age: 45
End: 2025-02-18
Payer: COMMERCIAL

## 2025-02-18 DIAGNOSIS — F32.5 DEPRESSION, MAJOR, IN REMISSION (CMS-HCC): ICD-10-CM

## 2025-02-18 DIAGNOSIS — F41.1 GENERALIZED ANXIETY DISORDER: ICD-10-CM

## 2025-02-19 RX ORDER — BUSPIRONE HYDROCHLORIDE 15 MG/1
15 TABLET ORAL 2 TIMES DAILY
Qty: 60 TABLET | Refills: 2 | Status: SHIPPED | OUTPATIENT
Start: 2025-02-19 | End: 2025-05-20

## 2025-02-19 RX ORDER — BUPROPION HYDROCHLORIDE 150 MG/1
150 TABLET ORAL EVERY MORNING
Qty: 30 TABLET | Refills: 2 | Status: SHIPPED | OUTPATIENT
Start: 2025-02-19 | End: 2025-05-20

## 2025-03-01 NOTE — PROGRESS NOTES
Nutrition Follow Up Assessment:     Patient Deanna Davis is a 44 y.o. female being seen via virtual visit  who was initially referred by Dr. Rojas on 12/19/24 for   1. Overweight (BMI 25.0-29.9)          Last nutrition visit was completed on 2/10/25 with previous goals set by RDN & pt:   She will set alarms to remind her to eat on regular basis     Nutrition Assessment      Nutrition History:  Food & Nutrition History: She reports she is doing better with not skipping meals. Has set alarms to remember to eat. She used the meal plans that were sent after our last visit - this has introduced more variety to her diet compared to her usual habits.  Food Allergies:  (none);  Food Intolerances: none  Fluid Intake: water - she thinks she needs to drink more. 1 bottle of wine per week (half a glass a few times per week)  Energy Intake: Good > 75 %    Food Preparation:  Cooking: Patient, Spouse/Significant Other  Grocery Shopping: Patient    Physical Activity:   She is a member of a dance fitness club, she attends 1 hour per week - Ni, hip hop, etc. She also becomes more physically active as the weather warms up. She has had periods in the past whe jogged/ran 5Ks but doesn't enjoy that form of exercise. She likes the accountability of attenidng a fitness class.    Anthropometrics:      Weight:  (has been between 149-152# recently, she desires 135# but this is more difficult to attain / sustain)                     Weight History:   Daily Weight  02/10/25 : 67.4 kg (148 lb 9.6 oz)  01/23/25 : 67.3 kg (148 lb 4.8 oz)  12/17/24 : 64.9 kg (143 lb)  06/11/24 : 65.8 kg (145 lb)  12/01/23 : 65.3 kg (144 lb)  06/06/23 : 63.5 kg (140 lb)  05/31/22 : 73.5 kg (162 lb)  04/29/21 : 93 kg (205 lb)  07/30/20 : 88.5 kg (195 lb)  04/28/20 : 91.6 kg (202 lb)    Weight Change %:  Weight History / % Weight Change: She reports weight is up about 15# from lowest, and weight varies about 2# up and down. Weight has varied widely in the past,  "see Mani's initial note for full weight history    Nutrition Focused Physical Exam Findings:  Defer, virtual visit    Nutrition Significant Labs:  CBC Trend:   Recent Labs     12/17/24  1542 06/22/24  0851 07/30/20  1348   WBC 5.2 3.9* 6.7   NRBC 0.0 0.0  --    RBC 4.87 4.59 4.85   HGB 15.0 14.4 14.6   HCT 44.7 42.4 43.9   MCV 92 92 91   MCH 30.8 31.4  --    MCHC 33.6 34.0 33.3   RDW 12.6 13.1 13.1    277 354   , RFP + Serum Mag Trend:   Recent Labs     12/17/24  1542 06/22/24  0851 07/30/20  1348   GLUCOSE 83 76 83   * 137 138   K 4.1 4.6 4.0   CL 99 103 104   CO2 29 30 29   ANIONGAP 10 9* 9*   BUN 28* 29* 10   CREATININE 0.79 0.74 0.81   EGFR >90 >90  --    CALCIUM 9.4 8.9 9.3   ALBUMIN 4.4 4.1 4.1   , LFT Trend:   Recent Labs     12/17/24  1542 06/22/24  0851 07/30/20  1348   ALBUMIN 4.4 4.1 4.1   BILITOT 0.3 0.5 0.5   ALKPHOS 34 27* 71   ALT 7 8 9   AST 13 12 13   PROT 7.5 6.5 7.4   , DM Specific Labs Trend (Includes HgbA1C, antibodies & fasting insulin): No results for input(s): \"HGBA1C\", \"CPEPTIDE\", \"INSULFAST\" in the last 57264 hours.    No lab exists for component: \"BHDRXBUT\", \"XZI4VBH\", Lipid Panel Trend:    Recent Labs     06/22/24  0851   CHOL 157   HDL 51.3   LDLCALC 93   VLDL 13   TRIG 64   , Iron Panel + Serum Ferritin Trend: No results for input(s): \"IRON\", \"UIBC\", \"TIBC\", \"IRONSAT\", \"FERRITIN\" in the last 29546 hours., Vitamin B12:   Lab Results   Component Value Date    LRDALFLD08 718 06/22/2024    , Folate: No results found for: \"FOLATE\" , and Vitamin D: No results found for: \"VITD25\"     Medications:  Current Outpatient Medications   Medication Instructions    buPROPion XL (WELLBUTRIN XL) 150 mg, oral, Every morning, Do not crush, chew, or split.    busPIRone (BUSPAR) 15 mg, oral, 2 times daily    spironolactone (ALDACTONE) 100 mg, Daily      Nutrition Diagnosis   Malnutrition Diagnosis  Patient has Malnutrition Diagnosis: No    Nutrition Diagnosis  Patient has Nutrition Diagnosis: " Yes  Diagnosis Status (1): Active  Nutrition Diagnosis 1: Disordered eating pattern  Related to (1): psychological causes that put emphasis on food, weight, or shape  As Evidenced by (1): pt admission of having disordered eating tendencies with restrictions of various types of nutrients and foods and pt admission of being told by intuitive eating therapist that she needs to work with eating disorder specific teams to amply address such tendencies and beliefs  Additional Assessment Information (1): Update: she has a pretty healthy outlook on her weight but remains committed to noting weight as an indicator of her success with nutrition  Additional Nutrition Diagnosis: Diagnosis 3  Diagnosis Status (2): Resolved  Nutrition Diagnosis 2: Inadequate carbohydrate intake  Related to (2): psychological causes that put emphasis on food, weight, or shape  As Evidenced by (2): pt dietary recall reflecting daily intake of protein at levels <25-50% daily recommended amount of carbohydrates  Additional Assessment Information (2): Update: she continues to eat a lower amount of carbohydrate, but not outside of a healthy range  Diagnosis Status (3): Active  Nutrition Diagnosis 3: Food and nutrition related knowledge deficit  Related to (3): ack of or limited prior nutrition-related education  As Evidenced by (3): verbalizes inaccurate or incomplete knowledge  Additional Assessment Information (3): Update: more education was provided today but she also is leading herself to her own conclusions - having ideas about exercise, alcohol, meal pattern       Nutrition Interventions/Recommendations   Nutrition Prescription: Oral nutrition Reduced calorie diet through meal planning    Nutrition Interventions:   Food and Nutrient Delivery: Meals & Snacks: Fluid-modified diet  Goals: 1. See how it feels this week to skip purchasing the bottle of wine. Consider treating herself to a non-alcoholic bottle of spirits or 6 pack of non-alcoholic  craft beer 2. Keep up eating at regular intervals  Other:: Physical activity  Goals: 1. Continue to dance weekly, consider adding a yoga class or other way to feel positive in her body another time per week, lawn mowing counts in the spring!     Coordination of Care: Collaboration and referral of nutrition care:  (N/A)     Nutrition Education:   Nutrition Education Content: Content related nutrition education, Physical activity guidance    Educational Handouts Provided: N/A    Nutrition Counseling:   Nutrition Counseling Strategies : Nutrition counseling based on motivational interviewing strategy, Nutrition counseling based on goal setting strategy    Readiness to Change : Good  Level of Understanding : Good  Anticipated Compliant : Good         Nutrition Monitoring and Evaluation   Food and Nutrient Intake  Monitoring and Evaluation Plan: Meal/snack pattern, Fluid intake  Fluid Intake: Estimated fluid intake  Criteria: 1 bottle wine or less per week  Meal/Snack Pattern: Estimated meal and snack pattern  Criteria: Eat a minimum of 3 times per day, and more ideally 3 meals + 2-3 snacks    Knowledge Belief Attitude Determination   Monitoring and Evaluation Plan: Physical activity  Physical activity: Consistency  Criteria: dance at least weekly                        Follow Up: 3/31 at 4 pm virtual    Time Spent  Prep time on day of patient encounter: 5 minutes  Time spent directly with patient, family or caregiver: 35 minutes  Additional Time Spent on Patient Care Activities: 0 minutes  Documentation Time: 7 minutes  Other Time Spent: 0 minutes  Total: 47 minutes       17459 Exp Problem Focused - Mod. Complex

## 2025-03-03 ENCOUNTER — APPOINTMENT (OUTPATIENT)
Dept: NUTRITION | Facility: CLINIC | Age: 45
End: 2025-03-03
Payer: COMMERCIAL

## 2025-03-03 DIAGNOSIS — E66.3 OVERWEIGHT (BMI 25.0-29.9): Primary | ICD-10-CM

## 2025-03-03 PROCEDURE — 97803 MED NUTRITION INDIV SUBSEQ: CPT | Performed by: DIETITIAN, REGISTERED

## 2025-03-04 NOTE — PATIENT INSTRUCTIONS
Reviewed changes she has made in the past 3 weeks, exploring the meal plan.   Discussed her current state of exercise and talked about how she anticipates having more activity as the weather warms. Noted that her exercise level depends on the season and that next winter she could consider signing up temporarily for an unlimited dance pass so she could attend dance class more than once per week.   Talked about her body weight, how habits are the focus instead of the number on the scale. Suggested she doesn't need to protect herself from weight gain by working towards the lower range of where she wants her weight to be, instead she can focus on being consistent about sustaining habits at a level that she feels comfortable sustaining. Discussed that to stay at the lower end of  the weight range she desires, she has to engage with habits more intensely which likely isn't sustainable.   Discussed alcohol consumption habit of about 1 bottle spread out across one  week and how she has been considering decreasing. Suggested she tries a week without purchasing it but doesn't need to commit to never purchasing again - one day / one decision at a time.

## 2025-03-06 ENCOUNTER — APPOINTMENT (OUTPATIENT)
Dept: GENETICS | Facility: CLINIC | Age: 45
End: 2025-03-06
Payer: COMMERCIAL

## 2025-03-13 DIAGNOSIS — F32.5 DEPRESSION, MAJOR, IN REMISSION (CMS-HCC): ICD-10-CM

## 2025-03-13 DIAGNOSIS — F41.1 GENERALIZED ANXIETY DISORDER: ICD-10-CM

## 2025-03-13 RX ORDER — BUPROPION HYDROCHLORIDE 150 MG/1
150 TABLET ORAL EVERY MORNING
Qty: 90 TABLET | Refills: 1 | Status: SHIPPED | OUTPATIENT
Start: 2025-03-13 | End: 2025-09-09

## 2025-03-13 RX ORDER — BUSPIRONE HYDROCHLORIDE 15 MG/1
15 TABLET ORAL 2 TIMES DAILY
Qty: 180 TABLET | Refills: 1 | Status: SHIPPED | OUTPATIENT
Start: 2025-03-13

## 2025-03-24 ENCOUNTER — APPOINTMENT (OUTPATIENT)
Dept: GENETICS | Facility: CLINIC | Age: 45
End: 2025-03-24
Payer: COMMERCIAL

## 2025-03-24 VITALS
WEIGHT: 148 LBS | HEIGHT: 63 IN | HEART RATE: 99 BPM | DIASTOLIC BLOOD PRESSURE: 72 MMHG | SYSTOLIC BLOOD PRESSURE: 108 MMHG | TEMPERATURE: 97.3 F | BODY MASS INDEX: 26.22 KG/M2

## 2025-03-24 DIAGNOSIS — Z80.3 FAMILY HISTORY OF BREAST CANCER: ICD-10-CM

## 2025-03-24 DIAGNOSIS — Z82.0 FAMILY HISTORY OF ALZHEIMER DISEASE: ICD-10-CM

## 2025-03-24 PROCEDURE — 96041 GENETIC COUNSELING SVC EA 30: CPT | Performed by: GENETIC COUNSELOR, MS

## 2025-03-24 ASSESSMENT — ENCOUNTER SYMPTOMS
DEPRESSION: 0
OCCASIONAL FEELINGS OF UNSTEADINESS: 0
LOSS OF SENSATION IN FEET: 0

## 2025-03-24 ASSESSMENT — PATIENT HEALTH QUESTIONNAIRE - PHQ9
1. LITTLE INTEREST OR PLEASURE IN DOING THINGS: NOT AT ALL
SUM OF ALL RESPONSES TO PHQ9 QUESTIONS 1 AND 2: 1
2. FEELING DOWN, DEPRESSED OR HOPELESS: SEVERAL DAYS

## 2025-03-24 NOTE — PROGRESS NOTES
Subjective   Patient ID: Deanna Davis is a 44 y.o. female who was referred by Nohemy Rojas DO to genetics clinic due to a family history of Alzheimer's disease. We reviewed the hereditary aspects of Alzheimer's, and the genetic testing options available.    Background Information: Deanna is a relatively healthy woman with a pertinent medical history of anxiety, depression, and uterine fibroids. Deanna recently had genetic testing via [facility] 39-gene CancerNext panel, which returned normal. She was referred to genetics clinic after voicing her concern regarding her maternal family history of Alzheimer's disease. Deanna does not currently have concerns regarding cognitive function.     Family History:  A three generation pedigree was collected, and was concerning for the following:    -Maternal aunt diagnosed with Alzheimer's at age 65,  at age 75.  -Maternal grandmother diagnosed with Alzheimer's in her 60s,  in her 70s.  -Mother is 73 and has no signs of dementia.  -Paternal grandfather diagnosed with dementia in his 90s,  at age 99.    Deanna Davis is of [place] descent. Consanguinity was denied.    The rest of the family history was negative for intellectual disability, autism, birth defects, multiple miscarriages, early onset cancer or kidney concerns, and other hereditary conditions.    Assessment/Plan   Genetic Test Ordered: no    Deanna Davis is a 44 y.o. woman who was referred for genetics clinic due to a family history of Alzheimer's disease. Deanna does not currently have concerns regarding cognitive function. We discussed the following:    Genetic evaluation is NOT a tool to diagnose dementia. Dementia is a term that describes a group of symptoms associated with cognitive and/or behavioral changes that interfere with the ability to perform everyday activities. The core mental functions that may be affected in individuals with dementia include:  memory,   communication,    personality,   behavior,   reasoning,   judgement, and   visual perception.   Alzheimer's disease (AD) and frontotemporal dementia (FTD) are the two most common types of dementia.     Every person has a baseline 10% to 15% risk of developing Alzheimer's during their lifetime. Factors such as family history and diet, exercise, drinking, and smoking habits can increase that number. Approximately 25% of all AD is familial (i.e., >=3 persons in a family have AD). Approximately 75% of all AD is nonfamilial (no known family history of AD))    A recent study showed that having any first-degree relative with Alzheimer's significantly increased risk (PMID: 83740143):  >=1 first-degree relative: RR (relative risk) 1.73   >=2 first-degree relatives: RR 3.98   >=3 first-degree relatives: RR 2.48   >=4 first-degree relatives: RR 14.77   Having one first-degree and two second degree relatives with Alzheimer's increases one's risk to develop Alzheimer's by 21 times (compared to someone without that family history).    Early-onset AD (before 60-65) accounts for ~5% of all cases of AD. 10-15% of individuals with early-onset AD have mutations in the ELIJAH, PSEN1, or PSEN2 genes (PMID: 97668555). The lifetime risk of dementia when having mutations in these genes is as high as 100%.     Testing these three genes is not clinically indicated in Deanna since her family tree does not suggest early-onset dementia given her maternal aunt and grandmother are the only affected family members, and they were diagnosed in their 60s with late-onset AD. Therefore, testing for the ELIJAH, PSEN1, or PSEN2 genes is not recommended. Testing these genes may return a variant of uncertain significance (VUS). When there is a VUS in a gene known to predispose to a neurodegenerative condition without preventative or therapeutic measures, it can be harmful psychologically.    We reviewed late-onset AD (LOAD). Most cases of dementia, including LOAD, have  multi-factorial inheritance and potentially involve multiple susceptibility genes. To date, the most firmly established genetic risk factor for late-onset disease is apolipoprotein E (APOE). The APOE gene has 3 major allelic variants - e2, e3, and e4.    A study describing the frequency of the e4 allele among individuals with AD found 56% had one copy of the APOE?e4 gene and 11% had two copies of the APOE?e4 gene (PMID: 01963069).    Having one e4 allele would increase the lifetime risk of AD from 10%-15% (baseline) to 15%-25%. Having one first degree relative with Alzheimer's increases the risk to develop Alzheimer's to 20-25% from 10-15%. APOE4 homozygotes (rare) have a lifetime risk of developing AD of 40%-45% by 75 years in females and 25%-30% by 80 in males.     We reviewed the limitations of the APOE testing:   APOE testing has LIMITED clinical utility and predictive value (PMID: [id number]). Presence of 1 or 2 copies of APOE e4 allele increases the lifetime risk of AD, but it does not constitute a diagnosis of AD  Absence of 1 or 2 APOE e4 alleles DOES NOT eliminate the risk for AD in an asymptomatic individual (~42% of individuals with AD do not have an APOE e4 allele)  Even if a person has 1 or 2 APOE e4 alleles, there is no change in clinical management and the person may not develop clinical dementia.   There is no prevention.   Having a positive result may impact insurability.   Deanna's risk based on having a family member with dementia/Alzheimer's is not significantly different than what her risk would be if he has an e4 allele.  Insurance will not pay for the test given that it is not recommended.  Testing of this gene is NOT recommended by professional organizations, such as the American College of Medical Genetics (ACMG). According to [facility] [place] [place] [facility], if an individual's clinical and family history are not suggestive of a single gene cause of dementia, no further genetics workup  is recommended.     We discussed Genetic Information Non-discrimination Act (NOEMI) as it relates to pre-symptomatic testing for the 3 early-onset AD genes. NOEMI prohibits discrimination by health insurance plans and employers based on one's genetic information. NOEMI does not apply to life, long-term care or disability insurance. These insurers are allowed to use genetic, personal or family health information to make coverage or premium decisions. Some states have their own genetic protection laws, providing additional security against genetic discrimination for these types of insurance. In addition, NOEMI does not apply to members of the [place] , veterans obtaining health care through the [place], individuals using the  Health Service, or federal employees enrolled in the Federal Employees Health Benefits program (FEHB)    PLAN:  In summary, there is no indicated genetic testing for Deanna and no genetic testing was ordered at her appointment today.  According to the CDC, the following are recommended to lower the risk of AD: prevent and manage high blood pressure; manage blood sugar; maintain a healthy weight; be physically active; quit smoking; avoid excessive drinking; prevent and correct hearing loss; get enough sleep.   We reviewed that if there are any changes to Deanna's family history, she should contact our office.   Please contact me with any questions.    Tammy Bee MS [facility] [place], [place]  Licensed Genetic Counselor  [facility]  [phone]    Total time spent on day of encounter: 44 minutes

## 2025-03-28 NOTE — PROGRESS NOTES
Nutrition Follow Up Assessment:     Patient Deanna Davis is a 44 y.o. female being seen via virtual visit  who was initially referred by Dr. Rojas on 12/19/24 for   1. Overweight (BMI 25.0-29.9)          Last nutrition visit was completed on 3/3/25 with previous goals set by RDN & pt:   See how it feels this week to skip purchasing the bottle of wine. Consider treating herself to a non-alcoholic bottle of spirits or 6 pack of non-alcoholic craft beer  Keep up eating at regular intervals  Continue to dance weekly, consider adding a yoga class or other way to feel positive in her body another time per week, lawn mowing counts in the spring!      Nutrition Assessment      Virtual or Telephone Consent    An interactive audio and video telecommunication system which permits real time communications between the patient (at the originating site) and provider (at the distant site) was utilized to provide this telehealth service.   Verbal consent was requested and obtained from Deanna Davis on this date, 03/31/25 for a telehealth visit and the patient's location was confirmed at the time of the visit.    Nutrition History:  Food & Nutrition History: She said that her weight continues to rise since our last visit. She said she was over-eating because she was not tracking, weighing, or measuring food. Weight was increasing 1-2 pounds per week until she started tracking again in [employer], and weight has started to go back down again since she resumed tracking. She is feeling more hungry now that she is back in a lower calorie diet. She said overall she is more stressed recently, she is working with her therapist and a psychiatrist. She reflected upon conflicting feelings - wanting to eat intuitively but knowing if she eats intuitively it will not necessarily promote weight loss or maintenance of weight she lost.  Energy Intake: Good > 75 %    Physical Activity:   She said she faltered on her regular exercise habit, but since  she's back to tracking calories she is more motivated to go for a walk/exercise.    Anthropometrics:    Weight History:   Daily Weight  03/24/25 : 67.1 kg (148 lb)  02/10/25 : 67.4 kg (148 lb 9.6 oz)  01/23/25 : 67.3 kg (148 lb 4.8 oz)  12/17/24 : 64.9 kg (143 lb)  06/11/24 : 65.8 kg (145 lb)  12/01/23 : 65.3 kg (144 lb)  06/06/23 : 63.5 kg (140 lb)  05/31/22 : 73.5 kg (162 lb)  04/29/21 : 93 kg (205 lb)  07/30/20 : 88.5 kg (195 lb)    Nutrition Focused Physical Exam Findings:  Defer, virtual visit    Nutrition Significant Labs:  No new labs since our last visit    Medications:  Current Outpatient Medications   Medication Instructions    buPROPion XL (WELLBUTRIN XL) 150 mg, oral, Every morning, Do not crush, chew, or split.    busPIRone (BUSPAR) 15 mg, oral, 2 times daily    spironolactone (ALDACTONE) 100 mg, Daily      Estimated Needs:  Total Energy Estimated Needs in 24 hours (kCal): 1503 kCal; Method for Estimating Needs: REE x 1.4 - 300 kcal for calorie deficit  Total Protein Estimated Needs in 24 Hours (g): 67.27 g; Method for Estimating 24 Hour Protein Needs: 1 g/kg   ;     ;             Nutrition Diagnosis   Malnutrition Diagnosis  Patient has Malnutrition Diagnosis: No    Nutrition Diagnosis  Patient has Nutrition Diagnosis: Yes  Diagnosis Status (1): Active  Nutrition Diagnosis 1: Disordered eating pattern  Related to (1): psychological causes that put emphasis on food, weight, or shape  As Evidenced by (1): pt admission of having disordered eating tendencies with restrictions of various types of nutrients and foods and pt admission of being told by intuitive eating therapist that she needs to work with eating disorder specific teams to amply address such tendencies and beliefs       Nutrition Interventions/Recommendations   Nutrition Prescription: Oral nutrition Regular diet, reduced in calories    Nutrition Interventions:   Food and Nutrient Delivery: Meals & Snacks: Energy-modified diet  Goals: 1. Target  about 1500 calories/day per food tracker, but also consider tracking habits that impact food choices (Recovery Record)     Coordination of Care: Collaboration and referral of nutrition care:  (N/A)     Nutrition Education:   Nutrition Education Content: Content related nutrition education    Educational Handouts Provided: emailed her name of viviane she can try (Recovery Record)    Nutrition Counseling:   Nutrition Counseling Strategies : Nutrition counseling based on motivational interviewing strategy, Nutrition counseling based on goal setting strategy    Readiness to Change : Good  Level of Understanding : Good  Anticipated Compliant : Good         Nutrition Monitoring and Evaluation   Food and Nutrient Intake  Monitoring and Evaluation Plan: Meal/snack pattern, Energy intake  Energy Intake: Estimated energy intake  Criteria: about 1500 calories/day  Meal/Snack Pattern: Estimated meal and snack pattern  Criteria: Eat a minimum of 3 times per day, and more ideally 3 meals + 2-3 snacks    Knowledge Belief Attitude Determination   Monitoring and Evaluation Plan: Physical activity  Physical activity: Consistency  Criteria: at least once weekly dance                        Follow Up: 4/14 at 4:30 virtual, and also pending 5/5 at 4 pm virtual    Time Spent  Prep time on day of patient encounter: 5 minutes  Time spent directly with patient, family or caregiver: 30 minutes  Additional Time Spent on Patient Care Activities: 0 minutes  Documentation Time: 7 minutes  Other Time Spent: 0 minutes  Total: 42 minutes

## 2025-03-31 ENCOUNTER — APPOINTMENT (OUTPATIENT)
Dept: NUTRITION | Facility: CLINIC | Age: 45
End: 2025-03-31
Payer: COMMERCIAL

## 2025-03-31 DIAGNOSIS — E66.3 OVERWEIGHT (BMI 25.0-29.9): Primary | ICD-10-CM

## 2025-03-31 PROCEDURE — 97803 MED NUTRITION INDIV SUBSEQ: CPT | Performed by: DIETITIAN, REGISTERED

## 2025-03-31 NOTE — PATIENT INSTRUCTIONS
Reviewed pros & cons of tracking and reassured her that it is ok to track, it doesn't mean she needs to give up entirely on intuitive eating guidelines. Reviewed her calorie goals, agreed 1500 or a little less than that is a deficit she can focus on. Discussed that she could use Recovery Record if she wants to track information about food / emotions instead of just the calories/nutrients about foods.   Advised that she's correct - intuitive eating doesn't necessarily produce weight loss. However, weight loss diet and intuitive eating diet can intersect in some ways, she should use her food log to lead to insights about what works well for her to feel well, encouraged her to look out for noticing those patterns. (i.e. does eating certain foods at certain times of the day help her to feel more satisfied? How can she keep herself accountable to eat meals  at regular intervals?)

## 2025-04-14 ENCOUNTER — APPOINTMENT (OUTPATIENT)
Dept: NUTRITION | Facility: CLINIC | Age: 45
End: 2025-04-14
Payer: COMMERCIAL

## 2025-04-14 DIAGNOSIS — E66.3 OVERWEIGHT (BMI 25.0-29.9): Primary | ICD-10-CM

## 2025-04-14 PROCEDURE — 97803 MED NUTRITION INDIV SUBSEQ: CPT | Performed by: DIETITIAN, REGISTERED

## 2025-04-14 NOTE — PATIENT INSTRUCTIONS
We discussed that it would be helpful for me to be able to connect with her therapist and I'll send a release of medical information form for her to fill out and return to me.   Talked about the pros/cons of food tracking. Discussed that she doesn't feel that the days she tracks are much different amount/type of food compared to the days she doesn't track. Suggested if she wants to track something, use the Recovery Record viviane instead (not in addition to calorie tracking).  If she is going to keep focusing on calories, encouraged her to add some more back into the plan so she doesn't feel so hungry, we can see if it impacts her weight.   Brought up that if she's feeling really frustrated, we could talk with her primary care about whether weight management medications could have a role in her care.

## 2025-04-14 NOTE — PROGRESS NOTES
Nutrition Follow Up Assessment:     Patient Deanna Davis is a 44 y.o. female being seen via virtual visit  who was initially referred by Dr. Rojas on 12/19/24 for   1. Overweight (BMI 25.0-29.9)          Last nutrition visit was completed on 3/31/25 with previous goals set by RDN & pt:   Target about 1500 calories/day per food tracker, but also consider tracking habits that impact food choices (Recovery Record)     Nutrition Assessment      Virtual or Telephone Consent    An interactive audio and video telecommunication system which permits real time communications between the patient (at the originating site) and provider (at the distant site) was utilized to provide this telehealth service.   Verbal consent was requested and obtained from Deanna Davis on this date, 04/15/25 for a telehealth visit and the patient's location was confirmed at the time of the visit.    Nutrition History:  Food & Nutrition History: She emailed me 4/11 due to feeling frustrated about not consistently losing weight. She has been tracking her food intake. The first 2 weeks that she logged, she ate significantly less food than prior to tracking. The third week she gained weight but attributed it to being away for the weekend. The last week she has gained weight. She is feeling burned out on logging. She downloaded Recovery Record, used it one time but felt like it was too much logging because she was also logging calories. She expressed worry that although she has gained only a few pounds, she thinks it might continue and her weight could return to a high level.  Energy Intake: Good > 75 % (appetite hasn't been satisfied by what she's eating.)    Physical Activity:   She has been on the treadmill a couple times in the past week, mowed the lawn yesterday.    Anthropometrics:  Weights at home:  4/11: 150  4/4: 149.6  3/28 148.4  3/21: 150.8  3/18: 153.7    Weight History:   Daily Weight  03/24/25 : 67.1 kg (148 lb)  02/10/25 : 67.4 kg  (148 lb 9.6 oz)  01/23/25 : 67.3 kg (148 lb 4.8 oz)  12/17/24 : 64.9 kg (143 lb)  06/11/24 : 65.8 kg (145 lb)  12/01/23 : 65.3 kg (144 lb)  06/06/23 : 63.5 kg (140 lb)  05/31/22 : 73.5 kg (162 lb)  04/29/21 : 93 kg (205 lb)  07/30/20 : 88.5 kg (195 lb)    Weight Change %:       Nutrition Focused Physical Exam Findings:  Defer due to virtual visit    Nutrition Significant Labs:  No new labs since last note    Medications:  Current Outpatient Medications   Medication Instructions    buPROPion XL (WELLBUTRIN XL) 150 mg, oral, Every morning, Do not crush, chew, or split.    busPIRone (BUSPAR) 15 mg, oral, 2 times daily    spironolactone (ALDACTONE) 100 mg, Daily      Nutrition Diagnosis   Malnutrition Diagnosis  Patient has Malnutrition Diagnosis: No    Nutrition Diagnosis  Patient has Nutrition Diagnosis: Yes  Diagnosis Status (1): Active  Nutrition Diagnosis 1: Disordered eating pattern  Related to (1): psychological causes that put emphasis on food, weight, or shape  As Evidenced by (1): pt admission of having disordered eating tendencies with restrictions of various types of nutrients and foods and pt admission of being told by intuitive eating therapist that she needs to work with eating disorder specific teams to amply address such tendencies and beliefs       Nutrition Interventions/Recommendations   Nutrition Prescription: Oral nutrition Regular diet, balanced for portions    Nutrition Interventions:   Food and Nutrient Delivery: Meals & Snacks: Other, specify:  Goals: 1. If she wants to track, she will try Recovery Record alone instead of focusing on calories.     Coordination of Care: Collaboration and referral of nutrition care:  (will connect with her therapist to find most effective way to help Deanna with her nutrition)     Nutrition Education:   Nutrition Education Content: Content related nutrition education      Educational Handouts Provided: none today, will email release of medical information 4/15 so I  can connect with her therapist    Nutrition Counseling:   Nutrition Counseling Strategies : Nutrition counseling based on motivational interviewing strategy, Nutrition counseling based on goal setting strategy    Readiness to Change : Good  Level of Understanding : Good  Anticipated Compliant : Good         Nutrition Monitoring and Evaluation   Food and Nutrient Intake  Monitoring and Evaluation Plan: Meal/snack pattern, Energy intake  Energy Intake: Estimated energy intake  Criteria: if she tracks calories, consider adding 300-400 to the baseline of 1500 to help quell appetite  Meal/Snack Pattern: Estimated meal and snack pattern  Criteria: Eat a minimum of 3 times per day, and more ideally 3 meals + 2-3 snacks         Anthropometric measurements  Monitoring and Evaluation Plan: Weight  Body Weight: Measured body weight  Criteria: Every 1-2 weeks, not more frequently                   Follow Up: 5/5 at 4 and 6/2 at 4 both virtual    Time Spent  Prep time on day of patient encounter: 5 minutes  Time spent directly with patient, family or caregiver: 30 minutes  Additional Time Spent on Patient Care Activities: 10 minutes  Documentation Time: 10 minutes  Other Time Spent: 0 minutes  Total: 55 minutes

## 2025-05-02 NOTE — PROGRESS NOTES
Nutrition Follow Up Assessment:     Patient Deanna Davis is a 45 y.o. female being seen via virtual visit  who was initially referred by Dr. Rojas on 12/19/24 for   1. Overweight (BMI 25.0-29.9)          Last nutrition visit was completed on 4/14/25 with previous goals set by RDN & pt:   If she wants to track, she will try Recovery Record alone instead of focusing on calories     Virtual or Telephone Consent    An interactive audio and video telecommunication system which permits real time communications between the patient (at the originating site) and provider (at the distant site) was utilized to provide this telehealth service.   Verbal consent was requested and obtained from Deanna Davis on this date, 05/06/25 for a telehealth visit and the patient's location was confirmed at the time of the visit.  Nutrition Assessment      Nutrition History:  Food & Nutrition History: After receiving a release of medical information, I spoke with her therapist on the phone last week to discuss tips for helping Deanna make forward progress with nutrition - she advised that Deanna is good at following directions, and having a specific meal plan would be best. She needs the meal plan to include foods she enjoys, there will be less stress of making decisions if she has a meal plan. Deanna said she is coming off of birthday weekend, is looking to get back into regular habits starting tomorrow because she wasn't following her usual habits this weekend and she consumed some alcohol. She'd like to send me her food diary for a week and have me help her know how to adjust it. Today's food intake has only been a protein shake, she hasn't felt like eating today.    Diet Recall:  Meal 1: B: frozen blueberries and cottage cheese one week, frozen blueberries & greek yogurt the following week. Sometimes toast and apple / PB with breakfast.  Snack 1: almond/walnut/pistachio mix  Meal 2: L: frozen broccoli and chicken sausage. She hesitates to  include bread at lunch if she already had it at breakfast.  Snack 2: celery/baby carrots with hummus. Later on the way home 3:30-4:30 bag of almond chips  Meal 3: D: Plate Method, dessert after dinner every night - couple pieces of dark chocolate or a fruit bar with dark chocolate coating  Energy Intake: Good > 75 % (is feeling less hungry since she stopped counting calories)    Food Preparation:  Cooking: Patient, Spouse/Significant Other  Grocery Shopping: Patient    Physical Activity:   boxing class, mowing the lawn twice per week    Anthropometrics:  She is now weighing every other week instead of weekly.     Weight History:   Daily Weight  03/24/25 : 67.1 kg (148 lb)  02/10/25 : 67.4 kg (148 lb 9.6 oz)  01/23/25 : 67.3 kg (148 lb 4.8 oz)  12/17/24 : 64.9 kg (143 lb)  06/11/24 : 65.8 kg (145 lb)  12/01/23 : 65.3 kg (144 lb)  06/06/23 : 63.5 kg (140 lb)  05/31/22 : 73.5 kg (162 lb)  04/29/21 : 93 kg (205 lb)  07/30/20 : 88.5 kg (195 lb)    Nutrition Focused Physical Exam Findings:  Defer due to virtual visit    Nutrition Significant Labs:  No new labs since last visit    Medications:  Current Outpatient Medications   Medication Instructions    buPROPion XL (WELLBUTRIN XL) 150 mg, oral, Every morning, Do not crush, chew, or split.    busPIRone (BUSPAR) 15 mg, oral, 2 times daily    spironolactone (ALDACTONE) 100 mg, Daily        Estimated Needs:  Total Energy Estimated Needs in 24 hours (kCal): 1503 kCal; Method for Estimating Needs: REE x 1.4 - 300 kcal for calorie deficit  Total Protein Estimated Needs in 24 Hours (g): 67.27 g; Method for Estimating 24 Hour Protein Needs: 1 g/kg   ;     ;                 Nutrition Diagnosis   Malnutrition Diagnosis  Patient has Malnutrition Diagnosis: No    Nutrition Diagnosis  Patient has Nutrition Diagnosis: Yes  Diagnosis Status (1): Active  Nutrition Diagnosis 1: Disordered eating pattern  Related to (1): psychological causes that put emphasis on food, weight, or shape  As  Evidenced by (1): pt admission of having disordered eating tendencies with restrictions of various types of nutrients and foods and pt admission of being told by intuitive eating therapist that she needs to work with eating disorder specific teams to amply address such tendencies and beliefs  Additional Assessment Information (1): Update: RDN's collaboration with patient's therapist has been helpful to promote effective meal planning for Deanna  Additional Nutrition Diagnosis: Diagnosis 2  Diagnosis Status (2): Active  Nutrition Diagnosis 2: Food and nutrition related knowledge deficit  Related to (2): inadequate exposure to nutrition information  As Evidenced by (2): verbalizes inaccurate or incomplete knowledge  Additional Assessment Information (2): Update: We are continuing to work together to empower her to have a meal plan that she likes but also work towards more flexibility around food     Nutrition Interventions/Recommendations   Nutrition Prescription: Oral nutrition Balanced diet    Nutrition Interventions:   Food and Nutrient Delivery: Meals & Snacks: Modification of diet for specific food and/or ingredient  Goals: 1. She will send me a food diary for 1-2 days within the next week and I will send her a meal plan that fits her estimated needs, including an exchange list     Coordination of Care: Collaboration and referral of nutrition care:  (N/A)     Nutrition Education:   Nutrition Education Content: Content related nutrition education    Educational Handouts Provided: meal plan including exchange list to be emailed within the week    Nutrition Counseling:   Nutrition Counseling Strategies : Nutrition counseling based on goal setting strategy    Readiness to Change : Good  Level of Understanding : Good  Anticipated Compliant : Good         Nutrition Monitoring and Evaluation   Food and Nutrient Intake  Monitoring and Evaluation Plan: Meal/snack pattern, Energy intake  Energy Intake: Estimated energy  intake  Criteria: 0015-1201 / day  Meal/Snack Pattern: Estimated meal and snack pattern  Criteria: Eat a minimum of 3 times per day, and more ideally 3 meals + 2-3 snacks    Knowledge Belief Attitude Determination   Monitoring and Evaluation Plan: Physical activity    Anthropometric measurements  Monitoring and Evaluation Plan: Weight                   Follow Up: 6/2 at 4 pm virtual    Time Spent  Prep time on day of patient encounter: 5 minutes  Time spent directly with patient, family or caregiver: 30 minutes  Additional Time Spent on Patient Care Activities: 30 minutes (meal planning)  Documentation Time: 10 minutes  Other Time Spent: 0 minutes  Total: 75 minutes

## 2025-05-05 ENCOUNTER — APPOINTMENT (OUTPATIENT)
Dept: NUTRITION | Facility: CLINIC | Age: 45
End: 2025-05-05
Payer: COMMERCIAL

## 2025-05-05 DIAGNOSIS — E66.3 OVERWEIGHT (BMI 25.0-29.9): Primary | ICD-10-CM

## 2025-05-05 PROCEDURE — 97803 MED NUTRITION INDIV SUBSEQ: CPT | Performed by: DIETITIAN, REGISTERED

## 2025-05-06 NOTE — PATIENT INSTRUCTIONS
Reviewed her usual intake and discussed that a meal plan can be created based on these foods, I can double check her foods are reaching her estimated needs and adjust the portions if needed. Also can create an exchange list if she wants some freedom of food choices - such as swapping out fruit, protein choices, etc. She will send me a food diary of 1-2 days in the next week so I can make sure her portions are fitting the plan.

## 2025-06-02 ENCOUNTER — APPOINTMENT (OUTPATIENT)
Dept: NUTRITION | Facility: CLINIC | Age: 45
End: 2025-06-02
Payer: COMMERCIAL

## 2025-06-27 NOTE — PROGRESS NOTES
Nutrition Follow Up Assessment:     Patient Deanna Davis is a 45 y.o. female being seen via virtual visit  who was initially referred by Dr. Rojas on 12/19/24 for   1. Overweight (BMI 25.0-29.9)          Last nutrition visit was completed on 5/5/25 with previous goals set by RDN & pt:    She will send me a food diary for 1-2 days within the next week and I will send her a meal plan that fits her estimated needs, including an exchange list     Virtual or Telephone Consent    An interactive audio and video telecommunication system which permits real time communications between the patient (at the originating site) and provider (at the distant site) was utilized to provide this telehealth service.   Verbal consent was requested and obtained from Deanna Davis on this date, 07/01/25 for a telehealth visit and the patient's location was confirmed at the time of the visit.    Nutrition Assessment      Nutrition History:  Food & Nutrition History: She tried using the exchange list plan I emailed after our last visit - she didn't find it helpful becuase adjusting her intake to meet what was in the plan was too complicated. She prefers to track nutrition in her usual way with a calorie tracker. She returned to work today after a staycation last week - she was relaxed about eating choices last week, upon returning and tracking again she is feeling more hungry. Today she wants to talk about getting back on track after vacation.  Nutrition-Related Medication Use: Prescription Medication Use: She recently started zoloft.    Diet Recall:  Meal 1: blueberries and greek yogurt  Snack 1: nuts  Meal 2: broccoli and chickens ausage  Snack 2: celery, cherry tomatoes  Fluid Intake: water, had a little more alchol last week    Anthropometrics:      Weight:  (148-151# at home recently)                     Weight History:   Daily Weight  03/24/25 : 67.1 kg (148 lb)  02/10/25 : 67.4 kg (148 lb 9.6 oz)  01/23/25 : 67.3 kg (148 lb 4.8  oz)  12/17/24 : 64.9 kg (143 lb)  06/11/24 : 65.8 kg (145 lb)  12/01/23 : 65.3 kg (144 lb)  06/06/23 : 63.5 kg (140 lb)  05/31/22 : 73.5 kg (162 lb)  04/29/21 : 93 kg (205 lb)  07/30/20 : 88.5 kg (195 lb)    Nutrition Focused Physical Exam Findings:  Defer    Nutrition Significant Labs:  No new labs since our last visit    Medications:  Current Outpatient Medications   Medication Instructions    buPROPion XL (WELLBUTRIN XL) 150 mg, oral, Every morning, Do not crush, chew, or split.    busPIRone (BUSPAR) 15 mg, oral, 2 times daily    spironolactone (ALDACTONE) 100 mg, Daily      Nutrition Diagnosis   Malnutrition Diagnosis  Patient has Malnutrition Diagnosis: No    Nutrition Diagnosis  Patient has Nutrition Diagnosis: Yes  Diagnosis Status (1): Active  Nutrition Diagnosis 1: Disordered eating pattern  Related to (1): psychological causes that put emphasis on food, weight, or shape  As Evidenced by (1): pt admission of having disordered eating tendencies with restrictions of various types of nutrients and foods and pt admission of being told by intuitive eating therapist that she needs to work with eating disorder specific teams to amply address such tendencies and beliefs  Additional Assessment Information (1): Update: she continues to have trepidation about relaxing eating habits due to concern it will result in re-gain of weight she has lost. Weight has been stable around 148-151# since January 2025.  Additional Nutrition Diagnosis: Diagnosis 2  Diagnosis Status (2): Active  Nutrition Diagnosis 2: Food and nutrition related knowledge deficit  Related to (2): inadequate exposure to nutrition information  As Evidenced by (2): verbalizes inaccurate or incomplete knowledge  Additional Assessment Information (2): Update: today we talked about strategies to get back on track after a vacation       Nutrition Interventions/Recommendations   Nutrition Prescription: Oral nutrition calorie-controlled diet    Nutrition  Interventions:   Food and Nutrient Delivery: Meals & Snacks: Energy-modified diet  Goals: 1. She will continue her current meal plan of 3 meals + snacks, being intentional about adding 1 extra 200 calorie snack per day in addition to her baseline intake, to address hunger. 2. She will limit intake of alcohol to 1 bottle of wine per week or less, using non-alcoholic beer to replace alcohol  Other:: Physical activity  Goals: 3. Begin using couch to 1d4 Pty viviane, especially training at least 2 days per week when she is working at home.     Nutrition Education:   Nutrition Education Content: Content related nutrition education  getting back to normal habits after vacation, alcohol intake, exercise, addressing appetite with a little more food intake    Educational Handouts Provided: N/A    Readiness to Change : Good  Level of Understanding : Good  Anticipated Compliant : Good         Nutrition Monitoring and Evaluation   Food and Nutrient Intake  Monitoring and Evaluation Plan: Meal/snack pattern, Energy intake  Energy Intake: Estimated energy intake  Criteria: 1833-1959 / day  Meal/Snack Pattern: Estimated meal and snack pattern  Criteria: Eat a minimum of 3 times per day, and more ideally 3 meals + 2-3 snacks    Knowledge Belief Attitude Determination   Monitoring and Evaluation Plan: Physical activity  Physical activity: Consistency  Criteria: couch to 1d4 Pty viviane at least 2 times per week    Anthropometric measurements  Monitoring and Evaluation Plan: Weight                       Follow Up: 8/25 at 4 pm virtual visit    Time Spent  Prep time on day of patient encounter: 5 minutes  Time spent directly with patient, family or caregiver: 30 minutes  Additional Time Spent on Patient Care Activities: 0 minutes  Documentation Time: 5 minutes  Other Time Spent: 0 minutes  Total: 40 minutes

## 2025-06-30 ENCOUNTER — APPOINTMENT (OUTPATIENT)
Dept: NUTRITION | Facility: CLINIC | Age: 45
End: 2025-06-30
Payer: COMMERCIAL

## 2025-06-30 DIAGNOSIS — E66.3 OVERWEIGHT (BMI 25.0-29.9): Primary | ICD-10-CM

## 2025-06-30 PROCEDURE — 97803 MED NUTRITION INDIV SUBSEQ: CPT | Performed by: DIETITIAN, REGISTERED

## 2025-06-30 NOTE — PATIENT INSTRUCTIONS
Talked about goals for getting back on track with food after vacation.   Discussed getting back into exercise. Talked about couch to 5k, doing training on days working at home, doing the workout in the morning instead of going into work early.   Talked about using non-alcoholic beer as substitute to wine especially [place] Day this week.

## 2025-07-01 ENCOUNTER — APPOINTMENT (OUTPATIENT)
Dept: PRIMARY CARE | Facility: CLINIC | Age: 45
End: 2025-07-01
Payer: COMMERCIAL

## 2025-07-01 VITALS
TEMPERATURE: 98.9 F | SYSTOLIC BLOOD PRESSURE: 111 MMHG | HEART RATE: 84 BPM | BODY MASS INDEX: 27.79 KG/M2 | WEIGHT: 151 LBS | RESPIRATION RATE: 21 BRPM | DIASTOLIC BLOOD PRESSURE: 78 MMHG | OXYGEN SATURATION: 98 % | HEIGHT: 62 IN

## 2025-07-01 DIAGNOSIS — Z00.00 ANNUAL PHYSICAL EXAM: Primary | ICD-10-CM

## 2025-07-01 DIAGNOSIS — F41.1 GENERALIZED ANXIETY DISORDER: Chronic | ICD-10-CM

## 2025-07-01 DIAGNOSIS — Z13.1 SCREENING FOR DIABETES MELLITUS: ICD-10-CM

## 2025-07-01 PROCEDURE — 99396 PREV VISIT EST AGE 40-64: CPT | Performed by: FAMILY MEDICINE

## 2025-07-01 PROCEDURE — 3008F BODY MASS INDEX DOCD: CPT | Performed by: FAMILY MEDICINE

## 2025-07-01 PROCEDURE — 1036F TOBACCO NON-USER: CPT | Performed by: FAMILY MEDICINE

## 2025-07-01 RX ORDER — CLONAZEPAM 0.25 MG/1
0.25 TABLET, ORALLY DISINTEGRATING ORAL EVERY 12 HOURS PRN
COMMUNITY
Start: 2025-04-05 | End: 2025-07-04

## 2025-07-01 RX ORDER — BUSPIRONE HYDROCHLORIDE 30 MG/1
1 TABLET ORAL
COMMUNITY
Start: 2025-06-04

## 2025-07-01 RX ORDER — SERTRALINE HYDROCHLORIDE 50 MG/1
50 TABLET, FILM COATED ORAL
COMMUNITY
Start: 2025-06-24

## 2025-07-01 SDOH — ECONOMIC STABILITY: FOOD INSECURITY: WITHIN THE PAST 12 MONTHS, YOU WORRIED THAT YOUR FOOD WOULD RUN OUT BEFORE YOU GOT MONEY TO BUY MORE.: NEVER TRUE

## 2025-07-01 SDOH — ECONOMIC STABILITY: FOOD INSECURITY: WITHIN THE PAST 12 MONTHS, THE FOOD YOU BOUGHT JUST DIDN'T LAST AND YOU DIDN'T HAVE MONEY TO GET MORE.: NEVER TRUE

## 2025-07-01 ASSESSMENT — PATIENT HEALTH QUESTIONNAIRE - PHQ9
8. MOVING OR SPEAKING SO SLOWLY THAT OTHER PEOPLE COULD HAVE NOTICED. OR THE OPPOSITE, BEING SO FIGETY OR RESTLESS THAT YOU HAVE BEEN MOVING AROUND A LOT MORE THAN USUAL: SEVERAL DAYS
5. POOR APPETITE OR OVEREATING: SEVERAL DAYS
2. FEELING DOWN, DEPRESSED OR HOPELESS: MORE THAN HALF THE DAYS
3. TROUBLE FALLING OR STAYING ASLEEP: SEVERAL DAYS
SUM OF ALL RESPONSES TO PHQ QUESTIONS 1-9: 11
7. TROUBLE CONCENTRATING ON THINGS, SUCH AS READING THE NEWSPAPER OR WATCHING TELEVISION: MORE THAN HALF THE DAYS
9. THOUGHTS THAT YOU WOULD BE BETTER OFF DEAD, OR OF HURTING YOURSELF: NOT AT ALL
SUM OF ALL RESPONSES TO PHQ9 QUESTIONS 1 & 2: 4
6. FEELING BAD ABOUT YOURSELF - OR THAT YOU ARE A FAILURE OR HAVE LET YOURSELF OR YOUR FAMILY DOWN: MORE THAN HALF THE DAYS
1. LITTLE INTEREST OR PLEASURE IN DOING THINGS: MORE THAN HALF THE DAYS
4. FEELING TIRED OR HAVING LITTLE ENERGY: NOT AT ALL

## 2025-07-01 ASSESSMENT — ANXIETY QUESTIONNAIRES
1. FEELING NERVOUS, ANXIOUS, OR ON EDGE: NEARLY EVERY DAY
5. BEING SO RESTLESS THAT IT IS HARD TO SIT STILL: NEARLY EVERY DAY
IF YOU CHECKED OFF ANY PROBLEMS ON THIS QUESTIONNAIRE, HOW DIFFICULT HAVE THESE PROBLEMS MADE IT FOR YOU TO DO YOUR WORK, TAKE CARE OF THINGS AT HOME, OR GET ALONG WITH OTHER PEOPLE: EXTREMELY DIFFICULT
6. BECOMING EASILY ANNOYED OR IRRITABLE: NEARLY EVERY DAY
4. TROUBLE RELAXING: NEARLY EVERY DAY
2. NOT BEING ABLE TO STOP OR CONTROL WORRYING: NEARLY EVERY DAY
3. WORRYING TOO MUCH ABOUT DIFFERENT THINGS: NEARLY EVERY DAY
GAD7 TOTAL SCORE: 21
7. FEELING AFRAID AS IF SOMETHING AWFUL MIGHT HAPPEN: NEARLY EVERY DAY

## 2025-07-01 ASSESSMENT — ENCOUNTER SYMPTOMS
LOSS OF SENSATION IN FEET: 0
DEPRESSION: 1

## 2025-07-01 ASSESSMENT — LIFESTYLE VARIABLES
HOW OFTEN DO YOU HAVE A DRINK CONTAINING ALCOHOL: NEVER
SKIP TO QUESTIONS 9-10: 1
HOW OFTEN DO YOU HAVE SIX OR MORE DRINKS ON ONE OCCASION: NEVER
HOW MANY STANDARD DRINKS CONTAINING ALCOHOL DO YOU HAVE ON A TYPICAL DAY: PATIENT DOES NOT DRINK
AUDIT-C TOTAL SCORE: 0

## 2025-07-01 ASSESSMENT — PAIN SCALES - GENERAL: PAINLEVEL_OUTOF10: 0-NO PAIN

## 2025-07-01 NOTE — PROGRESS NOTES
"Subjective   Patient ID: Deanna Davis is a 45 y.o. female who presents for Annual Exam.  Since her last visit, she has been working with a dietician to help with weight loss.     She has also established with psychiatry. They increased her Buspar and started her on Zoloft. She is starting to notice some improvement.         In addition to that documented in the HPI above, the additional ROS was obtained:  Constitutional: Denies fevers or chills  Eyes: Denies vision changes  ENMT: Denies trouble swallowing  Cardiovascular: Denies chest pain or heart racing  Respiratory: Denies SOB or cough    Patient Care Team:  Nohemy Rojas DO as PCP - General  Farzana Richmond MD as Consulting Physician (Obstetrics and Gynecology)  RAYSHAWN Tavera.CNP as Nurse Practitioner (Obstetrics and Gynecology)  Imelda Mosquera MD as Consulting Physician (Psychiatry)  Isatu Fierro RD, LD as Dietitian (Dietitian)  Tammy Bee Located within [facility] (Genetics)    Current Medications[1]    Objective     Visit Vitals  /78 (BP Location: Right arm, Patient Position: Sitting, BP Cuff Size: Adult)   Pulse 84   Temp 37.2 °C (98.9 °F) (Temporal)   Resp 21   Ht 1.575 m (5' 2\")   Wt 68.5 kg (151 lb)   SpO2 98%   BMI 27.62 kg/m²   Smoking Status Never   BSA 1.73 m²        Constitutional: Well nourished, well developed, appears stated age  Eyes: no scleral icterus, no conjunctival injection  Ears: normal external auditory canal, no retraction or bulging of tympanic membranes  Neck: no thyromegaly  Cardiovascular: regular rate and rhythm, no leg edema  Respiratory: normal respiratory effort, clear to auscultation bilaterally  Musculoskeletal: No gross deformities appreciated  Skin: Warm, dry. No rashes  Neurologic: Alert, CNs II-XII grossly intact..  Psych: Appropriate mood and affect.        Assessment & Plan  Annual physical exam  Has gyn  Mammogram done 7/2024, had breast MRI 3/2025  Screening labs done 6/2024         Screening for " diabetes mellitus    Orders:    Comprehensive metabolic panel; Future    Generalized anxiety disorder  Established with psychiatry since her last visit  Working with therapist as well                Follow up yearly and prn.    Nohemy Rojas DO         [1]   Current Outpatient Medications   Medication Sig Dispense Refill    buPROPion XL (Wellbutrin XL) 150 mg 24 hr tablet Take 1 tablet (150 mg) by mouth once daily in the morning. Do not crush, chew, or split. 90 tablet 1    busPIRone (Buspar) 30 mg tablet Take 1 tablet (30 mg) by mouth every 12 hours.      clonazePAM (KlonoPIN) 0.25 mg disintegrating tablet Dissolve 1 tablet (0.25 mg) in the mouth every 12 hours if needed.      sertraline (Zoloft) 50 mg tablet Take 1 tablet (50 mg) by mouth once daily.      spironolactone (Aldactone) 100 mg tablet Take 1 tablet (100 mg) by mouth once daily.       No current facility-administered medications for this visit.

## 2025-07-01 NOTE — PATIENT INSTRUCTIONS
Thank you for choosing [facility] for your healthcare.   As always if you have any questions or concerns please do not hesitate to call our office at [phone] or through [employer].    Have a great day!  Nohemy Rojas, DO

## 2025-08-05 ENCOUNTER — PATIENT MESSAGE (OUTPATIENT)
Dept: PRIMARY CARE | Facility: CLINIC | Age: 45
End: 2025-08-05
Payer: COMMERCIAL

## 2025-08-25 ENCOUNTER — APPOINTMENT (OUTPATIENT)
Dept: NUTRITION | Facility: CLINIC | Age: 45
End: 2025-08-25
Payer: COMMERCIAL

## 2025-08-25 DIAGNOSIS — E66.3 OVERWEIGHT (BMI 25.0-29.9): Primary | ICD-10-CM

## 2025-08-25 PROCEDURE — 97803 MED NUTRITION INDIV SUBSEQ: CPT | Performed by: DIETITIAN, REGISTERED

## 2025-08-26 VITALS — WEIGHT: 151 LBS | HEIGHT: 62 IN | BODY MASS INDEX: 27.79 KG/M2

## 2025-08-29 ENCOUNTER — TELEPHONE (OUTPATIENT)
Dept: PRIMARY CARE | Facility: CLINIC | Age: 45
End: 2025-08-29
Payer: COMMERCIAL

## 2025-10-06 ENCOUNTER — APPOINTMENT (OUTPATIENT)
Dept: NUTRITION | Facility: CLINIC | Age: 45
End: 2025-10-06
Payer: COMMERCIAL

## 2026-06-26 ENCOUNTER — APPOINTMENT (OUTPATIENT)
Dept: PRIMARY CARE | Facility: CLINIC | Age: 46
End: 2026-06-26
Payer: COMMERCIAL